# Patient Record
Sex: FEMALE | Race: WHITE | NOT HISPANIC OR LATINO | Employment: UNEMPLOYED | URBAN - METROPOLITAN AREA
[De-identification: names, ages, dates, MRNs, and addresses within clinical notes are randomized per-mention and may not be internally consistent; named-entity substitution may affect disease eponyms.]

---

## 2018-09-15 ENCOUNTER — APPOINTMENT (EMERGENCY)
Dept: RADIOLOGY | Facility: HOSPITAL | Age: 53
DRG: 300 | End: 2018-09-15
Payer: COMMERCIAL

## 2018-09-15 ENCOUNTER — HOSPITAL ENCOUNTER (INPATIENT)
Facility: HOSPITAL | Age: 53
LOS: 2 days | Discharge: HOME HEALTH | DRG: 300 | End: 2018-09-18
Attending: EMERGENCY MEDICINE | Admitting: SURGERY
Payer: COMMERCIAL

## 2018-09-15 ENCOUNTER — APPOINTMENT (EMERGENCY)
Dept: RADIOLOGY | Facility: HOSPITAL | Age: 53
DRG: 300 | End: 2018-09-15
Attending: EMERGENCY MEDICINE
Payer: COMMERCIAL

## 2018-09-15 DIAGNOSIS — I71.019 DISSECTION OF THORACIC AORTA (CMS/HCC): Primary | ICD-10-CM

## 2018-09-15 DIAGNOSIS — S22.49XA CLOSED FRACTURE OF MULTIPLE RIBS, UNSPECIFIED LATERALITY, INITIAL ENCOUNTER: ICD-10-CM

## 2018-09-15 DIAGNOSIS — R26.9 GAIT ABNORMALITY: ICD-10-CM

## 2018-09-15 DIAGNOSIS — Z04.1 EXAM FOLLOWING MVC (MOTOR VEHICLE COLLISION), NO APPARENT INJURY: ICD-10-CM

## 2018-09-15 DIAGNOSIS — R79.89 ELEVATED TROPONIN: ICD-10-CM

## 2018-09-15 LAB
APTT PPP: 28 SEC (ref 23–35)
BASOPHILS # BLD: 0.07 K/UL (ref 0.01–0.1)
BASOPHILS NFR BLD: 0.3 %
DIFFERENTIAL METHOD BLD: ABNORMAL
EOSINOPHIL # BLD: 0.36 K/UL (ref 0.04–0.36)
EOSINOPHIL NFR BLD: 1.6 %
ERYTHROCYTE [DISTWIDTH] IN BLOOD BY AUTOMATED COUNT: 13.8 % (ref 11.7–14.4)
HCT VFR BLDCO AUTO: 40.6 % (ref 35–45)
HGB BLD-MCNC: 14.3 G/DL (ref 11.8–15.7)
IMM GRANULOCYTES # BLD AUTO: 0.35 K/UL (ref 0–0.08)
IMM GRANULOCYTES NFR BLD AUTO: 1.6 %
INR PPP: 1.1 INR
LYMPHOCYTES # BLD: 3.71 K/UL (ref 1.2–3.5)
LYMPHOCYTES NFR BLD: 16.6 %
MCH RBC QN AUTO: 30.4 PG (ref 28–33.2)
MCHC RBC AUTO-ENTMCNC: 35.2 G/DL (ref 32.2–35.5)
MCV RBC AUTO: 86.2 FL (ref 83–98)
MONOCYTES # BLD: 1.07 K/UL (ref 0.28–0.8)
MONOCYTES NFR BLD: 4.8 %
NEUTROPHILS # BLD: 16.8 K/UL (ref 1.7–7)
NEUTS SEG NFR BLD: 75.1 %
NRBC BLD-RTO: 0 %
PDW BLD AUTO: 10.4 FL (ref 9.4–12.3)
PLATELET # BLD AUTO: 329 K/UL (ref 150–369)
PROTHROMBIN TIME: 13.7 SEC (ref 12.2–14.5)
RBC # BLD AUTO: 4.71 M/UL (ref 3.93–5.22)
WBC # BLD AUTO: 22.36 K/UL (ref 3.8–10.5)

## 2018-09-15 PROCEDURE — 85025 COMPLETE CBC W/AUTO DIFF WBC: CPT | Performed by: EMERGENCY MEDICINE

## 2018-09-15 PROCEDURE — 85730 THROMBOPLASTIN TIME PARTIAL: CPT | Performed by: EMERGENCY MEDICINE

## 2018-09-15 PROCEDURE — 85610 PROTHROMBIN TIME: CPT | Performed by: EMERGENCY MEDICINE

## 2018-09-15 PROCEDURE — 63600000 HC DRUGS/DETAIL CODE: Performed by: EMERGENCY MEDICINE

## 2018-09-15 PROCEDURE — 84484 ASSAY OF TROPONIN QUANT: CPT | Performed by: EMERGENCY MEDICINE

## 2018-09-15 PROCEDURE — 96374 THER/PROPH/DIAG INJ IV PUSH: CPT

## 2018-09-15 PROCEDURE — 96376 TX/PRO/DX INJ SAME DRUG ADON: CPT

## 2018-09-15 PROCEDURE — 72125 CT NECK SPINE W/O DYE: CPT

## 2018-09-15 PROCEDURE — 70450 CT HEAD/BRAIN W/O DYE: CPT

## 2018-09-15 PROCEDURE — 93005 ELECTROCARDIOGRAM TRACING: CPT | Performed by: EMERGENCY MEDICINE

## 2018-09-15 PROCEDURE — 80053 COMPREHEN METABOLIC PANEL: CPT | Performed by: EMERGENCY MEDICINE

## 2018-09-15 PROCEDURE — 99285 EMERGENCY DEPT VISIT HI MDM: CPT | Mod: 25

## 2018-09-15 PROCEDURE — 36415 COLL VENOUS BLD VENIPUNCTURE: CPT

## 2018-09-15 RX ORDER — ATORVASTATIN CALCIUM 40 MG/1
40 TABLET, FILM COATED ORAL DAILY
COMMUNITY
Start: 2018-07-06

## 2018-09-15 RX ORDER — FLUOXETINE HYDROCHLORIDE 40 MG/1
CAPSULE ORAL
COMMUNITY
End: 2023-08-21

## 2018-09-15 RX ORDER — MORPHINE SULFATE 4 MG/ML
4 INJECTION, SOLUTION INTRAMUSCULAR; INTRAVENOUS EVERY 30 MIN PRN
Status: DISCONTINUED | OUTPATIENT
Start: 2018-09-15 | End: 2018-09-16

## 2018-09-15 RX ORDER — CELECOXIB 100 MG/1
100 CAPSULE ORAL
COMMUNITY
End: 2023-08-21

## 2018-09-15 RX ORDER — AMLODIPINE BESYLATE 5 MG/1
5 TABLET ORAL
COMMUNITY
Start: 2018-07-06 | End: 2018-09-18 | Stop reason: HOSPADM

## 2018-09-15 RX ORDER — ESOMEPRAZOLE MAGNESIUM 40 MG/1
40 CAPSULE, DELAYED RELEASE ORAL
COMMUNITY
Start: 2018-04-23 | End: 2023-08-26 | Stop reason: HOSPADM

## 2018-09-15 RX ORDER — VIT C/E/ZN/COPPR/LUTEIN/ZEAXAN 250MG-90MG
CAPSULE ORAL
COMMUNITY
End: 2023-08-21

## 2018-09-15 RX ORDER — CLOPIDOGREL BISULFATE 75 MG/1
75 TABLET ORAL DAILY
COMMUNITY
Start: 2018-05-16

## 2018-09-15 RX ORDER — MODAFINIL 100 MG/1
TABLET ORAL
COMMUNITY
End: 2023-08-21

## 2018-09-15 RX ADMIN — MORPHINE SULFATE 4 MG: 4 INJECTION INTRAVENOUS at 23:41

## 2018-09-15 ASSESSMENT — ENCOUNTER SYMPTOMS
ABDOMINAL PAIN: 1
DIARRHEA: 0
NAUSEA: 0
SORE THROAT: 0
HEADACHES: 0
COUGH: 0
SHORTNESS OF BREATH: 0
FATIGUE: 0
VOMITING: 0
BACK PAIN: 0
FEVER: 0
NECK PAIN: 0
DIZZINESS: 0
WEAKNESS: 1
ACTIVITY CHANGE: 0
CHEST TIGHTNESS: 0

## 2018-09-16 ENCOUNTER — APPOINTMENT (EMERGENCY)
Dept: RADIOLOGY | Facility: HOSPITAL | Age: 53
DRG: 300 | End: 2018-09-16
Attending: EMERGENCY MEDICINE
Payer: COMMERCIAL

## 2018-09-16 PROBLEM — Z04.1 EXAM FOLLOWING MVC (MOTOR VEHICLE COLLISION), NO APPARENT INJURY: Status: ACTIVE | Noted: 2018-09-16

## 2018-09-16 LAB
ABO + RH BLD: NORMAL
ALBUMIN SERPL-MCNC: 3.2 G/DL (ref 3.4–5)
ALBUMIN SERPL-MCNC: 3.6 G/DL (ref 3.4–5)
ALP SERPL-CCNC: 105 U/L (ref 35–126)
ALP SERPL-CCNC: 125 U/L (ref 35–126)
ALT SERPL-CCNC: 149 U/L (ref 11–54)
ALT SERPL-CCNC: 97 U/L (ref 11–54)
AMPHET UR QL SCN: ABNORMAL
ANION GAP SERPL CALC-SCNC: 10 MEQ/L (ref 3–15)
ANION GAP SERPL CALC-SCNC: 8 MEQ/L (ref 3–15)
APTT PPP: 32 SEC (ref 23–35)
AST SERPL-CCNC: 103 U/L (ref 15–41)
AST SERPL-CCNC: 201 U/L (ref 15–41)
ATRIAL RATE: 90
BACTERIA URNS QL MICRO: 1 /HPF
BARBITURATES UR QL SCN: ABNORMAL
BENZODIAZ UR QL SCN: ABNORMAL
BILIRUB SERPL-MCNC: 0.6 MG/DL (ref 0.3–1.2)
BILIRUB SERPL-MCNC: 0.6 MG/DL (ref 0.3–1.2)
BILIRUB UR QL STRIP.AUTO: NEGATIVE MG/DL
BLD GP AB SCN SERPL QL: NEGATIVE
BUN SERPL-MCNC: 12 MG/DL (ref 8–20)
BUN SERPL-MCNC: 13 MG/DL (ref 8–20)
CA-I BLD-SCNC: 1.15 MMOL/L (ref 1.15–1.27)
CALCIUM SERPL-MCNC: 8.9 MG/DL (ref 8.9–10.3)
CALCIUM SERPL-MCNC: 9.4 MG/DL (ref 8.9–10.3)
CANNABINOIDS UR QL SCN: ABNORMAL
CHLORIDE SERPL-SCNC: 102 MEQ/L (ref 98–109)
CHLORIDE SERPL-SCNC: 104 MEQ/L (ref 98–109)
CLARITY UR REFRACT.AUTO: CLEAR
CO2 SERPL-SCNC: 27 MEQ/L (ref 22–32)
CO2 SERPL-SCNC: 28 MEQ/L (ref 22–32)
COCAINE UR QL SCN: ABNORMAL
COLOR UR AUTO: YELLOW
CREAT SERPL-MCNC: 0.9 MG/DL (ref 0.6–1.1)
CREAT SERPL-MCNC: 0.9 MG/DL (ref 0.6–1.1)
D AG BLD QL: NEGATIVE
ERYTHROCYTE [DISTWIDTH] IN BLOOD BY AUTOMATED COUNT: 14 % (ref 11.7–14.4)
ERYTHROCYTE [DISTWIDTH] IN BLOOD BY AUTOMATED COUNT: 14 % (ref 11.7–14.4)
ERYTHROCYTE [DISTWIDTH] IN BLOOD BY AUTOMATED COUNT: 14.2 % (ref 11.7–14.4)
ETHANOL SERPL-MCNC: <5 MG/DL
GFR SERPL CREATININE-BSD FRML MDRD: >60 ML/MIN/1.73M*2
GFR SERPL CREATININE-BSD FRML MDRD: >60 ML/MIN/1.73M*2
GLUCOSE SERPL-MCNC: 112 MG/DL (ref 70–99)
GLUCOSE SERPL-MCNC: 124 MG/DL (ref 70–99)
GLUCOSE UR STRIP.AUTO-MCNC: NEGATIVE MG/DL
HCT VFR BLDCO AUTO: 34 % (ref 35–45)
HCT VFR BLDCO AUTO: 35.4 % (ref 35–45)
HCT VFR BLDCO AUTO: 35.6 % (ref 35–45)
HGB BLD-MCNC: 11.3 G/DL (ref 11.8–15.7)
HGB BLD-MCNC: 12.4 G/DL (ref 11.8–15.7)
HGB BLD-MCNC: 12.4 G/DL (ref 11.8–15.7)
HGB UR QL STRIP.AUTO: 1
HYALINE CASTS #/AREA URNS LPF: ABNORMAL /LPF
INR PPP: 1.1 INR
KETONES UR STRIP.AUTO-MCNC: NEGATIVE MG/DL
LABORATORY COMMENT REPORT: NORMAL
LACTATE SERPL-SCNC: 1.3 MMOL/L (ref 0.4–2)
LACTATE SERPL-SCNC: 1.9 MMOL/L (ref 0.4–2)
LACTATE SERPL-SCNC: 2.6 MMOL/L (ref 0.4–2)
LEUKOCYTE ESTERASE UR QL STRIP.AUTO: 1
MAGNESIUM SERPL-MCNC: 1.5 MG/DL (ref 1.8–2.5)
MCH RBC QN AUTO: 28.8 PG (ref 28–33.2)
MCH RBC QN AUTO: 29.8 PG (ref 28–33.2)
MCH RBC QN AUTO: 30.2 PG (ref 28–33.2)
MCHC RBC AUTO-ENTMCNC: 33.2 G/DL (ref 32.2–35.5)
MCHC RBC AUTO-ENTMCNC: 34.8 G/DL (ref 32.2–35.5)
MCHC RBC AUTO-ENTMCNC: 35 G/DL (ref 32.2–35.5)
MCV RBC AUTO: 85.1 FL (ref 83–98)
MCV RBC AUTO: 86.7 FL (ref 83–98)
MCV RBC AUTO: 86.8 FL (ref 83–98)
NITRITE UR QL STRIP.AUTO: NEGATIVE
OPIATES UR QL SCN: POSITIVE
P AXIS: 64
PCP UR QL SCN: ABNORMAL
PDW BLD AUTO: 10 FL (ref 9.4–12.3)
PDW BLD AUTO: 10.1 FL (ref 9.4–12.3)
PDW BLD AUTO: 9.8 FL (ref 9.4–12.3)
PH UR STRIP.AUTO: 6 [PH]
PHOSPHATE SERPL-MCNC: 3.1 MG/DL (ref 2.4–4.7)
PLATELET # BLD AUTO: 249 K/UL (ref 150–369)
PLATELET # BLD AUTO: 261 K/UL (ref 150–369)
PLATELET # BLD AUTO: 267 K/UL (ref 150–369)
POTASSIUM SERPL-SCNC: 3.1 MEQ/L (ref 3.6–5.1)
POTASSIUM SERPL-SCNC: 3.2 MEQ/L (ref 3.6–5.1)
PR INTERVAL: 130
PROT SERPL-MCNC: 5.8 G/DL (ref 6–8.2)
PROT SERPL-MCNC: 7.2 G/DL (ref 6–8.2)
PROT UR QL STRIP.AUTO: 2
PROTHROMBIN TIME: 13.9 SEC (ref 12.2–14.5)
QRS DURATION: 86
QT INTERVAL: 368
QTC CALCULATION(BAZETT): 450
R AXIS: 4
RAINBOW HOLD SPECIMEN: NORMAL
RBC # BLD AUTO: 3.92 M/UL (ref 3.93–5.22)
RBC # BLD AUTO: 4.1 M/UL (ref 3.93–5.22)
RBC # BLD AUTO: 4.16 M/UL (ref 3.93–5.22)
RBC #/AREA URNS HPF: ABNORMAL /HPF
SODIUM SERPL-SCNC: 138 MEQ/L (ref 136–144)
SODIUM SERPL-SCNC: 141 MEQ/L (ref 136–144)
SP GR UR REFRACT.AUTO: >1.035
SQUAMOUS URNS QL MICRO: 1 /HPF
T WAVE AXIS: -13
TROPONIN I SERPL-MCNC: 0.07 NG/ML
TROPONIN I SERPL-MCNC: 0.11 NG/ML
TROPONIN I SERPL-MCNC: 0.19 NG/ML
TROPONIN I SERPL-MCNC: 0.31 NG/ML
TROPONIN I SERPL-MCNC: 0.32 NG/ML
UROBILINOGEN UR STRIP-ACNC: 0.2 EU/DL
VENTRICULAR RATE: 90
WBC # BLD AUTO: 13.23 K/UL (ref 3.8–10.5)
WBC # BLD AUTO: 14.52 K/UL (ref 3.8–10.5)
WBC # BLD AUTO: 14.82 K/UL (ref 3.8–10.5)
WBC #/AREA URNS HPF: ABNORMAL /HPF

## 2018-09-16 PROCEDURE — 82330 ASSAY OF CALCIUM: CPT | Performed by: NURSE PRACTITIONER

## 2018-09-16 PROCEDURE — 83605 ASSAY OF LACTIC ACID: CPT | Performed by: PHYSICIAN ASSISTANT

## 2018-09-16 PROCEDURE — 74160 CT ABDOMEN W/CONTRAST: CPT

## 2018-09-16 PROCEDURE — G0480 DRUG TEST DEF 1-7 CLASSES: HCPCS | Performed by: PHYSICIAN ASSISTANT

## 2018-09-16 PROCEDURE — 85610 PROTHROMBIN TIME: CPT | Performed by: NURSE PRACTITIONER

## 2018-09-16 PROCEDURE — 25000000 HC PHARMACY GENERAL: Performed by: STUDENT IN AN ORGANIZED HEALTH CARE EDUCATION/TRAINING PROGRAM

## 2018-09-16 PROCEDURE — 63700000 HC SELF-ADMINISTRABLE DRUG: Performed by: PHYSICIAN ASSISTANT

## 2018-09-16 PROCEDURE — 20000000 HC ROOM AND CARE ICU

## 2018-09-16 PROCEDURE — 93005 ELECTROCARDIOGRAM TRACING: CPT | Performed by: PHYSICIAN ASSISTANT

## 2018-09-16 PROCEDURE — 87086 URINE CULTURE/COLONY COUNT: CPT | Performed by: NURSE PRACTITIONER

## 2018-09-16 PROCEDURE — 71045 X-RAY EXAM CHEST 1 VIEW: CPT

## 2018-09-16 PROCEDURE — 25000000 HC PHARMACY GENERAL: Performed by: NURSE PRACTITIONER

## 2018-09-16 PROCEDURE — 83735 ASSAY OF MAGNESIUM: CPT | Performed by: NURSE PRACTITIONER

## 2018-09-16 PROCEDURE — 81001 URINALYSIS AUTO W/SCOPE: CPT | Performed by: PHYSICIAN ASSISTANT

## 2018-09-16 PROCEDURE — 73564 X-RAY EXAM KNEE 4 OR MORE: CPT | Mod: LT,76

## 2018-09-16 PROCEDURE — 63600000 HC DRUGS/DETAIL CODE: Performed by: PHYSICIAN ASSISTANT

## 2018-09-16 PROCEDURE — 73564 X-RAY EXAM KNEE 4 OR MORE: CPT | Mod: RT

## 2018-09-16 PROCEDURE — 85027 COMPLETE CBC AUTOMATED: CPT | Performed by: NURSE PRACTITIONER

## 2018-09-16 PROCEDURE — 73110 X-RAY EXAM OF WRIST: CPT | Mod: RT

## 2018-09-16 PROCEDURE — 73130 X-RAY EXAM OF HAND: CPT | Mod: LT

## 2018-09-16 PROCEDURE — 86850 RBC ANTIBODY SCREEN: CPT

## 2018-09-16 PROCEDURE — 63600105 HC IODINE BASED CONTRAST: Performed by: EMERGENCY MEDICINE

## 2018-09-16 PROCEDURE — 84484 ASSAY OF TROPONIN QUANT: CPT | Performed by: PHYSICIAN ASSISTANT

## 2018-09-16 PROCEDURE — 36415 COLL VENOUS BLD VENIPUNCTURE: CPT | Performed by: EMERGENCY MEDICINE

## 2018-09-16 PROCEDURE — 25800000 HC PHARMACY IV SOLUTIONS: Performed by: PHYSICIAN ASSISTANT

## 2018-09-16 PROCEDURE — 99221 1ST HOSP IP/OBS SF/LOW 40: CPT | Performed by: SURGERY

## 2018-09-16 PROCEDURE — 86920 COMPATIBILITY TEST SPIN: CPT

## 2018-09-16 PROCEDURE — 84450 TRANSFERASE (AST) (SGOT): CPT | Performed by: NURSE PRACTITIONER

## 2018-09-16 PROCEDURE — 93005 ELECTROCARDIOGRAM TRACING: CPT | Performed by: SURGERY

## 2018-09-16 PROCEDURE — 71260 CT THORAX DX C+: CPT

## 2018-09-16 PROCEDURE — 83605 ASSAY OF LACTIC ACID: CPT | Performed by: EMERGENCY MEDICINE

## 2018-09-16 PROCEDURE — 25000000 HC PHARMACY GENERAL: Performed by: PHYSICIAN ASSISTANT

## 2018-09-16 PROCEDURE — 80307 DRUG TEST PRSMV CHEM ANLYZR: CPT | Performed by: PHYSICIAN ASSISTANT

## 2018-09-16 PROCEDURE — 84100 ASSAY OF PHOSPHORUS: CPT | Performed by: NURSE PRACTITIONER

## 2018-09-16 PROCEDURE — 63600000 HC DRUGS/DETAIL CODE: Performed by: EMERGENCY MEDICINE

## 2018-09-16 PROCEDURE — 93010 ELECTROCARDIOGRAM REPORT: CPT | Performed by: INTERNAL MEDICINE

## 2018-09-16 PROCEDURE — 85730 THROMBOPLASTIN TIME PARTIAL: CPT | Performed by: NURSE PRACTITIONER

## 2018-09-16 RX ORDER — POTASSIUM CHLORIDE 750 MG/1
20 TABLET, FILM COATED, EXTENDED RELEASE ORAL AS NEEDED
Status: DISCONTINUED | OUTPATIENT
Start: 2018-09-16 | End: 2018-09-18 | Stop reason: HOSPADM

## 2018-09-16 RX ORDER — SODIUM CHLORIDE 9 MG/ML
INJECTION, SOLUTION INTRAVENOUS CONTINUOUS
Status: DISCONTINUED | OUTPATIENT
Start: 2018-09-16 | End: 2018-09-16

## 2018-09-16 RX ORDER — CLOPIDOGREL BISULFATE 75 MG/1
75 TABLET ORAL DAILY
Status: DISCONTINUED | OUTPATIENT
Start: 2018-09-16 | End: 2018-09-16

## 2018-09-16 RX ORDER — LIDOCAINE 560 MG/1
1 PATCH PERCUTANEOUS; TOPICAL; TRANSDERMAL DAILY
Status: DISCONTINUED | OUTPATIENT
Start: 2018-09-16 | End: 2018-09-18 | Stop reason: HOSPADM

## 2018-09-16 RX ORDER — METOPROLOL TARTRATE 1 MG/ML
2.5 INJECTION, SOLUTION INTRAVENOUS
Status: DISCONTINUED | OUTPATIENT
Start: 2018-09-16 | End: 2018-09-17

## 2018-09-16 RX ORDER — AMLODIPINE BESYLATE 5 MG/1
5 TABLET ORAL DAILY
Status: DISCONTINUED | OUTPATIENT
Start: 2018-09-16 | End: 2018-09-16

## 2018-09-16 RX ORDER — METOPROLOL TARTRATE 1 MG/ML
2.5 INJECTION, SOLUTION INTRAVENOUS ONCE
Status: COMPLETED | OUTPATIENT
Start: 2018-09-16 | End: 2018-09-16

## 2018-09-16 RX ORDER — FLUOXETINE HYDROCHLORIDE 20 MG/1
40 CAPSULE ORAL DAILY
Status: DISCONTINUED | OUTPATIENT
Start: 2018-09-16 | End: 2018-09-18 | Stop reason: HOSPADM

## 2018-09-16 RX ORDER — IBUPROFEN 200 MG
16-32 TABLET ORAL AS NEEDED
Status: DISCONTINUED | OUTPATIENT
Start: 2018-09-16 | End: 2018-09-18

## 2018-09-16 RX ORDER — OXYCODONE HYDROCHLORIDE 5 MG/1
5 TABLET ORAL EVERY 4 HOURS PRN
Status: DISCONTINUED | OUTPATIENT
Start: 2018-09-16 | End: 2018-09-18 | Stop reason: HOSPADM

## 2018-09-16 RX ORDER — DEXTROSE 50 % IN WATER (D50W) INTRAVENOUS SYRINGE
25 AS NEEDED
Status: DISCONTINUED | OUTPATIENT
Start: 2018-09-16 | End: 2018-09-18 | Stop reason: HOSPADM

## 2018-09-16 RX ORDER — OXYCODONE HYDROCHLORIDE 5 MG/1
10 TABLET ORAL EVERY 4 HOURS PRN
Status: DISCONTINUED | OUTPATIENT
Start: 2018-09-16 | End: 2018-09-18 | Stop reason: HOSPADM

## 2018-09-16 RX ORDER — ONDANSETRON HYDROCHLORIDE 2 MG/ML
4 INJECTION, SOLUTION INTRAVENOUS EVERY 8 HOURS PRN
Status: DISCONTINUED | OUTPATIENT
Start: 2018-09-16 | End: 2018-09-18 | Stop reason: HOSPADM

## 2018-09-16 RX ORDER — HEPARIN SODIUM 5000 [USP'U]/ML
5000 INJECTION, SOLUTION INTRAVENOUS; SUBCUTANEOUS EVERY 8 HOURS
Status: DISCONTINUED | OUTPATIENT
Start: 2018-09-16 | End: 2018-09-16

## 2018-09-16 RX ORDER — MAGNESIUM SULFATE HEPTAHYDRATE 40 MG/ML
2 INJECTION, SOLUTION INTRAVENOUS AS NEEDED
Status: DISCONTINUED | OUTPATIENT
Start: 2018-09-16 | End: 2018-09-18 | Stop reason: HOSPADM

## 2018-09-16 RX ORDER — DEXTROSE 40 %
15-30 GEL (GRAM) ORAL AS NEEDED
Status: DISCONTINUED | OUTPATIENT
Start: 2018-09-16 | End: 2018-09-18

## 2018-09-16 RX ORDER — AMOXICILLIN 250 MG
1 CAPSULE ORAL 2 TIMES DAILY
Status: DISCONTINUED | OUTPATIENT
Start: 2018-09-16 | End: 2018-09-18 | Stop reason: HOSPADM

## 2018-09-16 RX ORDER — POTASSIUM CHLORIDE 750 MG/1
40 TABLET, FILM COATED, EXTENDED RELEASE ORAL AS NEEDED
Status: DISCONTINUED | OUTPATIENT
Start: 2018-09-16 | End: 2018-09-18 | Stop reason: HOSPADM

## 2018-09-16 RX ORDER — POTASSIUM CHLORIDE 14.9 MG/ML
20 INJECTION INTRAVENOUS AS NEEDED
Status: DISCONTINUED | OUTPATIENT
Start: 2018-09-16 | End: 2018-09-18

## 2018-09-16 RX ORDER — SODIUM CHLORIDE 9 MG/ML
5 INJECTION, SOLUTION INTRAVENOUS AS NEEDED
Status: ACTIVE | OUTPATIENT
Start: 2018-09-16 | End: 2018-09-17

## 2018-09-16 RX ORDER — CHOLECALCIFEROL (VITAMIN D3) 25 MCG
1000 TABLET ORAL DAILY
Status: DISCONTINUED | OUTPATIENT
Start: 2018-09-16 | End: 2018-09-18 | Stop reason: HOSPADM

## 2018-09-16 RX ORDER — ACETAMINOPHEN 325 MG/1
650 TABLET ORAL EVERY 6 HOURS
Status: DISCONTINUED | OUTPATIENT
Start: 2018-09-16 | End: 2018-09-18 | Stop reason: HOSPADM

## 2018-09-16 RX ORDER — PANTOPRAZOLE SODIUM 40 MG/1
40 TABLET, DELAYED RELEASE ORAL DAILY
Status: DISCONTINUED | OUTPATIENT
Start: 2018-09-16 | End: 2018-09-18 | Stop reason: HOSPADM

## 2018-09-16 RX ORDER — METOPROLOL TARTRATE 1 MG/ML
2.5 INJECTION, SOLUTION INTRAVENOUS ONCE
Status: COMPLETED | OUTPATIENT
Start: 2018-09-17 | End: 2018-09-16

## 2018-09-16 RX ADMIN — MORPHINE SULFATE 4 MG: 4 INJECTION INTRAVENOUS at 04:49

## 2018-09-16 RX ADMIN — METOPROLOL TARTRATE 2.5 MG: 1 INJECTION, SOLUTION INTRAVENOUS at 21:27

## 2018-09-16 RX ADMIN — MAGNESIUM SULFATE IN WATER 2 G: 40 INJECTION, SOLUTION INTRAVENOUS at 14:15

## 2018-09-16 RX ADMIN — PANTOPRAZOLE SODIUM 40 MG: 40 TABLET, DELAYED RELEASE ORAL at 08:48

## 2018-09-16 RX ADMIN — POTASSIUM CHLORIDE 40 MEQ: 2 INJECTION, SOLUTION, CONCENTRATE INTRAVENOUS at 14:15

## 2018-09-16 RX ADMIN — METOPROLOL TARTRATE 2.5 MG: 1 INJECTION, SOLUTION INTRAVENOUS at 23:55

## 2018-09-16 RX ADMIN — IOHEXOL 125 ML: 300 INJECTION, SOLUTION INTRAVENOUS at 02:15

## 2018-09-16 RX ADMIN — ACETAMINOPHEN 650 MG: 325 TABLET ORAL at 12:50

## 2018-09-16 RX ADMIN — HEPARIN SODIUM 5000 UNITS: 5000 INJECTION, SOLUTION INTRAVENOUS; SUBCUTANEOUS at 08:49

## 2018-09-16 RX ADMIN — OXYCODONE HYDROCHLORIDE 5 MG: 5 TABLET ORAL at 12:50

## 2018-09-16 RX ADMIN — OXYCODONE HYDROCHLORIDE 10 MG: 5 TABLET ORAL at 16:46

## 2018-09-16 RX ADMIN — ACETAMINOPHEN 650 MG: 325 TABLET ORAL at 08:48

## 2018-09-16 RX ADMIN — ACETAMINOPHEN 650 MG: 325 TABLET ORAL at 18:07

## 2018-09-16 RX ADMIN — OXYCODONE HYDROCHLORIDE 10 MG: 5 TABLET ORAL at 22:43

## 2018-09-16 RX ADMIN — CALCIUM GLUCONATE 1000 MG: 94 INJECTION, SOLUTION INTRAVENOUS at 16:46

## 2018-09-16 RX ADMIN — METOPROLOL TARTRATE 2.5 MG: 1 INJECTION, SOLUTION INTRAVENOUS at 11:10

## 2018-09-16 RX ADMIN — ACETAMINOPHEN 650 MG: 325 TABLET ORAL at 22:44

## 2018-09-16 RX ADMIN — FLUOXETINE HYDROCHLORIDE 40 MG: 20 CAPSULE ORAL at 08:48

## 2018-09-16 RX ADMIN — LIDOCAINE 1 PATCH: 246 PATCH TOPICAL at 08:48

## 2018-09-16 RX ADMIN — CLOPIDOGREL BISULFATE 75 MG: 75 TABLET, FILM COATED ORAL at 08:48

## 2018-09-16 RX ADMIN — VITAMIN D, TAB 1000IU (100/BT) 1000 UNITS: 25 TAB at 08:48

## 2018-09-16 RX ADMIN — METOPROLOL TARTRATE 2.5 MG: 1 INJECTION, SOLUTION INTRAVENOUS at 18:08

## 2018-09-16 ASSESSMENT — COGNITIVE AND FUNCTIONAL STATUS - GENERAL
HELP NEEDED FOR PERSONAL GROOMING: 4 - NONE
STANDING UP FROM CHAIR USING ARMS: 4 - NONE
EATING MEALS: 4 - NONE
TOILETING: 4 - NONE
CLIMB 3 TO 5 STEPS WITH RAILING: 4 - NONE
DRESSING REGULAR LOWER BODY CLOTHING: 4 - NONE
WALKING IN HOSPITAL ROOM: 4 - NONE
HELP NEEDED FOR BATHING: 4 - NONE
DRESSING REGULAR UPPER BODY CLOTHING: 4 - NONE
MOVING TO AND FROM BED TO CHAIR: 4 - NONE

## 2018-09-16 NOTE — PROGRESS NOTES
Patient: Shayla Lawrence  Location: [facility] Down 0354  MRN: [id number]  Today's date: 9/16/2018    Attempted to see patient for therapy. Unable due to patient medically unstable.  Newly found aortic tear, will await clearance by MD for OT evaluation.
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
## 2018-09-16 NOTE — PROGRESS NOTES
Trauma attending:    Patient seen and evaluated in interim follow-up.  Patient without new complaint.  Specifically no new chest pain.    Radiology review after preliminary [place]  reports intimal abnormality just distal to the left subclavian artery, and separately at the descending thoracic aorta.  There is no dissection with either of these.  There is no periadventitial hematoma at either site.    Patient's blood pressure  and heart rate .    The patient reports no new symptoms, reporting only focal pain at the sites of fractures anteriorly, at the chest wall.    Her bilateral upper extremity pulse exam is symmetric as are the femoral pulses.  No abdominal/visceral pain.    I reviewed the case with the vascular surgeon on call Dr. Hauser.  We will evaluate the patient and help direct further diagnostics and all interventions.  For now we will treat her as a posttraumatic aortic injury and beta-blocker to be instituted controlling DP/DT.    I have reviewed all the above with the patient and her daughter in detail.  Patient will be moved to a higher level of care for monitoring.    What I believe is a separate issue to the aortic concerns above, the patient with a mild elevation in her troponin.  No indication of acute coronary syndrome on EKG, otherwise.  Cardiology evaluation appropriate.    Due to the potential for acute decompensation with the above, ICU level of care appropriate.      I provided 90 minutes of critical care services to support this life/organ threatening illness. This time reflects daily cumulative, direct time spent in the care and direct coordination of care of the patient throughout the day by the attending physician, excluding separately billable services, procedures, teaching time and time spent by non-physician providers.

## 2018-09-16 NOTE — ED NOTES
Joseph richards called to [address] after room was changed from sicu bed 2 to 354     Joseph Hunt, JOSE  09/16/18 1614
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
## 2018-09-16 NOTE — PROGRESS NOTES
Patient: Shayla Lawrence  Location:  Surgical Step Down 0354  MRN: [id number]  Today's date: 9/16/2018    Attempted to see patient for therapy. Unable due to patient medically unstable (new dx aortic tear, await medical clearance for mobilization). MD with patient

## 2018-09-16 NOTE — PLAN OF CARE
Problem: Patient Care Overview  Goal: Discharge Needs Assessment   09/16/18 1546   DC Needs Assessment   Concerns To Be Addressed no discharge needs identified   Readmission Within The Last 30 Days no previous admission in last 30 days   Provider Choice List(s) Given no   Anticipated Discharge Disposition home with outpatient services   Current Health   Anticipated Changes Related to Illness none   Activity/Self Care ROS   Equipment Currently Used at Home cane, straight;other (see comments)  (ELECTRIC WHEELCHAIR)

## 2018-09-16 NOTE — CONSULTS
Please see Vascular Consult note.    Sunil Hall MD  PGY-3, Department of Surgery  [facility]  Beeper: [phone]  Cell: [phone]

## 2018-09-17 ENCOUNTER — APPOINTMENT (INPATIENT)
Dept: RADIOLOGY | Facility: HOSPITAL | Age: 53
DRG: 300 | End: 2018-09-17
Attending: PHYSICIAN ASSISTANT
Payer: COMMERCIAL

## 2018-09-17 PROBLEM — S36.113A LIVER LACERATION: Status: ACTIVE | Noted: 2018-09-17

## 2018-09-17 PROBLEM — R26.9 GAIT ABNORMALITY: Status: ACTIVE | Noted: 2018-09-17

## 2018-09-17 PROBLEM — S22.43XA CLOSED FRACTURE OF MULTIPLE RIBS OF BOTH SIDES: Status: ACTIVE | Noted: 2018-09-17
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
## 2018-09-17 PROBLEM — I71.00 AORTIC DISSECTION (CMS/HCC): Status: ACTIVE | Noted: 2018-09-17

## 2018-09-17 PROBLEM — S27.0XXA TRAUMATIC PNEUMOTHORAX: Status: ACTIVE | Noted: 2018-09-17

## 2018-09-17 LAB
ANION GAP SERPL CALC-SCNC: 9 MEQ/L (ref 3–15)
APTT PPP: 33 SEC (ref 23–35)
ATRIAL RATE: 82
BUN SERPL-MCNC: 11 MG/DL (ref 8–20)
CALCIUM SERPL-MCNC: 9.2 MG/DL (ref 8.9–10.3)
CHLORIDE SERPL-SCNC: 107 MEQ/L (ref 98–109)
CO2 SERPL-SCNC: 26 MEQ/L (ref 22–32)
CREAT SERPL-MCNC: 0.8 MG/DL (ref 0.6–1.1)
ERYTHROCYTE [DISTWIDTH] IN BLOOD BY AUTOMATED COUNT: 14 % (ref 11.7–14.4)
GFR SERPL CREATININE-BSD FRML MDRD: >60 ML/MIN/1.73M*2
GLUCOSE SERPL-MCNC: 94 MG/DL (ref 70–99)
HCT VFR BLDCO AUTO: 34.7 % (ref 35–45)
HGB BLD-MCNC: 11.6 G/DL (ref 11.8–15.7)
INR PPP: 1 INR
MAGNESIUM SERPL-MCNC: 1.9 MG/DL (ref 1.8–2.5)
MCH RBC QN AUTO: 29.3 PG (ref 28–33.2)
MCHC RBC AUTO-ENTMCNC: 33.4 G/DL (ref 32.2–35.5)
MCV RBC AUTO: 87.6 FL (ref 83–98)
P AXIS: 50
PDW BLD AUTO: 9.9 FL (ref 9.4–12.3)
PHOSPHATE SERPL-MCNC: 3.1 MG/DL (ref 2.4–4.7)
PLATELET # BLD AUTO: 246 K/UL (ref 150–369)
POTASSIUM SERPL-SCNC: 3.9 MEQ/L (ref 3.6–5.1)
PR INTERVAL: 134
PROTHROMBIN TIME: 13.5 SEC (ref 12.2–14.5)
QRS DURATION: 86
QT INTERVAL: 376
QTC CALCULATION(BAZETT): 439
R AXIS: 10
RBC # BLD AUTO: 3.96 M/UL (ref 3.93–5.22)
SODIUM SERPL-SCNC: 142 MEQ/L (ref 136–144)
T WAVE AXIS: 83
TROPONIN I SERPL-MCNC: 0.04 NG/ML
VENTRICULAR RATE: 82
WBC # BLD AUTO: 15.65 K/UL (ref 3.8–10.5)

## 2018-09-17 PROCEDURE — 36415 COLL VENOUS BLD VENIPUNCTURE: CPT | Performed by: PHYSICIAN ASSISTANT

## 2018-09-17 PROCEDURE — 85730 THROMBOPLASTIN TIME PARTIAL: CPT | Performed by: PHYSICIAN ASSISTANT

## 2018-09-17 PROCEDURE — 20000000 HC ROOM AND CARE ICU

## 2018-09-17 PROCEDURE — 63700000 HC SELF-ADMINISTRABLE DRUG: Performed by: PHYSICIAN ASSISTANT

## 2018-09-17 PROCEDURE — 25000000 HC PHARMACY GENERAL: Performed by: NURSE PRACTITIONER

## 2018-09-17 PROCEDURE — 85610 PROTHROMBIN TIME: CPT | Performed by: PHYSICIAN ASSISTANT

## 2018-09-17 PROCEDURE — 63600105 HC IODINE BASED CONTRAST: Performed by: PHYSICIAN ASSISTANT

## 2018-09-17 PROCEDURE — 83735 ASSAY OF MAGNESIUM: CPT | Performed by: PHYSICIAN ASSISTANT

## 2018-09-17 PROCEDURE — 80048 BASIC METABOLIC PNL TOTAL CA: CPT | Performed by: PHYSICIAN ASSISTANT

## 2018-09-17 PROCEDURE — 85027 COMPLETE CBC AUTOMATED: CPT | Performed by: NURSE PRACTITIONER

## 2018-09-17 PROCEDURE — 97166 OT EVAL MOD COMPLEX 45 MIN: CPT | Mod: GO

## 2018-09-17 PROCEDURE — 84484 ASSAY OF TROPONIN QUANT: CPT | Performed by: PHYSICIAN ASSISTANT

## 2018-09-17 PROCEDURE — 25000000 HC PHARMACY GENERAL: Performed by: PHYSICIAN ASSISTANT

## 2018-09-17 PROCEDURE — 99233 SBSQ HOSP IP/OBS HIGH 50: CPT | Performed by: SURGERY

## 2018-09-17 PROCEDURE — 84100 ASSAY OF PHOSPHORUS: CPT | Performed by: PHYSICIAN ASSISTANT

## 2018-09-17 PROCEDURE — 97162 PT EVAL MOD COMPLEX 30 MIN: CPT | Mod: GP

## 2018-09-17 PROCEDURE — 25800000 HC PHARMACY IV SOLUTIONS: Performed by: PHYSICIAN ASSISTANT

## 2018-09-17 PROCEDURE — 71045 X-RAY EXAM CHEST 1 VIEW: CPT

## 2018-09-17 PROCEDURE — 71275 CT ANGIOGRAPHY CHEST: CPT

## 2018-09-17 PROCEDURE — 63700000 HC SELF-ADMINISTRABLE DRUG: Performed by: NURSE PRACTITIONER

## 2018-09-17 PROCEDURE — 97530 THERAPEUTIC ACTIVITIES: CPT | Mod: GO

## 2018-09-17 RX ORDER — ATORVASTATIN CALCIUM 20 MG/1
20 TABLET, FILM COATED ORAL
Status: DISCONTINUED | OUTPATIENT
Start: 2018-09-17 | End: 2018-09-18 | Stop reason: HOSPADM

## 2018-09-17 RX ORDER — IBUPROFEN 200 MG
1 TABLET ORAL DAILY
Status: DISCONTINUED | OUTPATIENT
Start: 2018-09-17 | End: 2018-09-18 | Stop reason: HOSPADM

## 2018-09-17 RX ORDER — CLOPIDOGREL BISULFATE 75 MG/1
75 TABLET ORAL DAILY
Status: DISCONTINUED | OUTPATIENT
Start: 2018-09-17 | End: 2018-09-17

## 2018-09-17 RX ORDER — LANOLIN ALCOHOL/MO/W.PET/CERES
1000 CREAM (GRAM) TOPICAL DAILY
COMMUNITY
End: 2023-08-21

## 2018-09-17 RX ORDER — IBUPROFEN 200 MG
1 TABLET ORAL DAILY
Qty: 30 PATCH | Refills: 0 | OUTPATIENT
Start: 2018-09-17 | End: 2023-08-26 | Stop reason: HOSPADM

## 2018-09-17 RX ORDER — METOPROLOL TARTRATE 1 MG/ML
5 INJECTION, SOLUTION INTRAVENOUS ONCE
Status: COMPLETED | OUTPATIENT
Start: 2018-09-17 | End: 2018-09-17

## 2018-09-17 RX ORDER — OXYCODONE HYDROCHLORIDE 5 MG/1
10 TABLET ORAL EVERY 6 HOURS PRN
COMMUNITY

## 2018-09-17 RX ORDER — MODAFINIL 100 MG/1
100 TABLET ORAL DAILY
Status: DISCONTINUED | OUTPATIENT
Start: 2018-09-17 | End: 2018-09-18 | Stop reason: HOSPADM

## 2018-09-17 RX ORDER — METOPROLOL TARTRATE 25 MG/1
25 TABLET, FILM COATED ORAL 2 TIMES DAILY
Status: DISCONTINUED | OUTPATIENT
Start: 2018-09-17 | End: 2018-09-18 | Stop reason: HOSPADM

## 2018-09-17 RX ORDER — LANOLIN ALCOHOL/MO/W.PET/CERES
1000 CREAM (GRAM) TOPICAL DAILY
Status: DISCONTINUED | OUTPATIENT
Start: 2018-09-17 | End: 2018-09-18 | Stop reason: HOSPADM

## 2018-09-17 RX ORDER — LANOLIN ALCOHOL/MO/W.PET/CERES
400 CREAM (GRAM) TOPICAL ONCE
Status: COMPLETED | OUTPATIENT
Start: 2018-09-17 | End: 2018-09-17

## 2018-09-17 RX ADMIN — Medication 1000 MCG: at 18:20

## 2018-09-17 RX ADMIN — OXYCODONE HYDROCHLORIDE 10 MG: 5 TABLET ORAL at 19:59

## 2018-09-17 RX ADMIN — Medication 5 MG/HR: at 08:08

## 2018-09-17 RX ADMIN — METOPROLOL TARTRATE 25 MG: 25 TABLET ORAL at 20:43

## 2018-09-17 RX ADMIN — OXYCODONE HYDROCHLORIDE 10 MG: 5 TABLET ORAL at 04:15

## 2018-09-17 RX ADMIN — VITAMIN D, TAB 1000IU (100/BT) 1000 UNITS: 25 TAB at 08:10

## 2018-09-17 RX ADMIN — OXYCODONE HYDROCHLORIDE 10 MG: 5 TABLET ORAL at 15:33

## 2018-09-17 RX ADMIN — NICOTINE 1 PATCH: 21 PATCH TRANSDERMAL at 15:33

## 2018-09-17 RX ADMIN — POTASSIUM CHLORIDE 20 MEQ: 750 TABLET, FILM COATED, EXTENDED RELEASE ORAL at 05:45

## 2018-09-17 RX ADMIN — MAGNESIUM OXIDE TAB 400 MG (240 MG ELEMENTAL MG) 400 MG: 400 (240 MG) TAB at 05:45

## 2018-09-17 RX ADMIN — PANTOPRAZOLE SODIUM 40 MG: 40 TABLET, DELAYED RELEASE ORAL at 08:11

## 2018-09-17 RX ADMIN — METOPROLOL TARTRATE 5 MG: 1 INJECTION, SOLUTION INTRAVENOUS at 10:44

## 2018-09-17 RX ADMIN — ACETAMINOPHEN 650 MG: 325 TABLET ORAL at 05:33

## 2018-09-17 RX ADMIN — Medication 2.5 MG/HR: at 01:05

## 2018-09-17 RX ADMIN — ACETAMINOPHEN 650 MG: 325 TABLET ORAL at 18:19

## 2018-09-17 RX ADMIN — ACETAMINOPHEN 650 MG: 325 TABLET ORAL at 13:22

## 2018-09-17 RX ADMIN — MULTIPLE VITAMINS W/ MINERALS TAB 1 TABLET: TAB at 18:19

## 2018-09-17 RX ADMIN — ATORVASTATIN CALCIUM 20 MG: 20 TABLET, FILM COATED ORAL at 20:00

## 2018-09-17 RX ADMIN — SODIUM CHLORIDE 1000 ML: 9 INJECTION, SOLUTION INTRAVENOUS at 04:30

## 2018-09-17 RX ADMIN — LIDOCAINE 1 PATCH: 246 PATCH TOPICAL at 08:10

## 2018-09-17 RX ADMIN — FLUOXETINE HYDROCHLORIDE 40 MG: 20 CAPSULE ORAL at 08:10

## 2018-09-17 RX ADMIN — IOHEXOL 125 ML: 300 INJECTION, SOLUTION INTRAVENOUS at 05:12

## 2018-09-17 RX ADMIN — MODAFINIL 100 MG: 100 TABLET ORAL at 18:20

## 2018-09-17 ASSESSMENT — COGNITIVE AND FUNCTIONAL STATUS - GENERAL
WALKING IN HOSPITAL ROOM: 3 - A LITTLE
HELP NEEDED FOR PERSONAL GROOMING: 4 - NONE
TOILETING: 3 - A LITTLE
HELP NEEDED FOR BATHING: 3 - A LITTLE
CLIMB 3 TO 5 STEPS WITH RAILING: 3 - A LITTLE
EATING MEALS: 4 - NONE
STANDING UP FROM CHAIR USING ARMS: 3 - A LITTLE
DRESSING REGULAR UPPER BODY CLOTHING: 3 - A LITTLE
MOVING TO AND FROM BED TO CHAIR: 3 - A LITTLE
DRESSING REGULAR LOWER BODY CLOTHING: 3 - A LITTLE
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
## 2018-09-17 NOTE — PLAN OF CARE
Problem: Patient Care Overview  Goal: Plan of Care Review  Outcome: Outcome(s) Achieved Date Met: 09/17/18 09/17/18 8248   Coping/Psychosocial   Plan Of Care Reviewed With patient   Plan of Care Review   Progress progress toward functional goals as expected   Outcome Summary patient requires min A for mobility, +R knee buckling. rec home with PT, RW, and family support at discharge.        Problem: Fall Risk (Adult)  Goal: Absence of Falls  Outcome: Ongoing (interventions implemented as appropriate)      Problem: Acute Therapy Services Goal & Intervention Plan  Goal: Bed Mobility Goal  Outcome: Ongoing (interventions implemented as appropriate)    Goal: Gait Training Goal  Outcome: Ongoing (interventions implemented as appropriate)    Goal: Stairs Goal  Outcome: Ongoing (interventions implemented as appropriate)    Goal: Transfer Training Goal  Outcome: Ongoing (interventions implemented as appropriate)

## 2018-09-17 NOTE — ASSESSMENT & PLAN NOTE
This is being managed nonsurgically.  Her blood pressure is being kept low.  She understands the importance of avoiding Valsalva, lifting, or straining.
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
## 2018-09-17 NOTE — ASSESSMENT & PLAN NOTE
She has abrasions of the left lower extremity but contusion of the right knee.  There is no evidence of fracture or significant internal derangement.  She is safe for ambulating with a walker and should use this for a few days.

## 2018-09-17 NOTE — PLAN OF CARE
Problem: Acute Therapy Services Goal & Intervention Plan  Goal: Occupational Therapy Goal  Outcome: Ongoing (interventions implemented as appropriate)
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
## 2018-09-18 ENCOUNTER — APPOINTMENT (INPATIENT)
Dept: RADIOLOGY | Facility: HOSPITAL | Age: 53
DRG: 300 | End: 2018-09-18
Attending: PHYSICIAN ASSISTANT
Payer: COMMERCIAL

## 2018-09-18 VITALS
HEART RATE: 71 BPM | TEMPERATURE: 98.2 F | DIASTOLIC BLOOD PRESSURE: 51 MMHG | SYSTOLIC BLOOD PRESSURE: 93 MMHG | OXYGEN SATURATION: 97 % | BODY MASS INDEX: 30 KG/M2 | RESPIRATION RATE: 20 BRPM | WEIGHT: 163 LBS | HEIGHT: 62 IN

## 2018-09-18 PROBLEM — R73.03 PREDIABETES: Status: ACTIVE | Noted: 2018-04-06

## 2018-09-18 PROBLEM — Z86.73 HISTORY OF STROKE: Status: ACTIVE | Noted: 2017-10-31

## 2018-09-18 LAB
ANION GAP SERPL CALC-SCNC: 9 MEQ/L (ref 3–15)
APTT PPP: 33 SEC (ref 23–35)
BACTERIA UR CULT: NORMAL
BUN SERPL-MCNC: 10 MG/DL (ref 8–20)
CALCIUM SERPL-MCNC: 8.9 MG/DL (ref 8.9–10.3)
CHLORIDE SERPL-SCNC: 106 MEQ/L (ref 98–109)
CO2 SERPL-SCNC: 22 MEQ/L (ref 22–32)
CREAT SERPL-MCNC: 0.9 MG/DL (ref 0.6–1.1)
ERYTHROCYTE [DISTWIDTH] IN BLOOD BY AUTOMATED COUNT: 14.2 % (ref 11.7–14.4)
GFR SERPL CREATININE-BSD FRML MDRD: >60 ML/MIN/1.73M*2
GLUCOSE SERPL-MCNC: 107 MG/DL (ref 70–99)
HCT VFR BLDCO AUTO: 35.1 % (ref 35–45)
HGB BLD-MCNC: 11.7 G/DL (ref 11.8–15.7)
INR PPP: 1 INR
MAGNESIUM SERPL-MCNC: 1.7 MG/DL (ref 1.8–2.5)
MCH RBC QN AUTO: 29.3 PG (ref 28–33.2)
MCHC RBC AUTO-ENTMCNC: 33.3 G/DL (ref 32.2–35.5)
MCV RBC AUTO: 88 FL (ref 83–98)
PDW BLD AUTO: 10.2 FL (ref 9.4–12.3)
PHOSPHATE SERPL-MCNC: 3.1 MG/DL (ref 2.4–4.7)
PLATELET # BLD AUTO: 230 K/UL (ref 150–369)
POTASSIUM SERPL-SCNC: 4.1 MEQ/L (ref 3.6–5.1)
PROTHROMBIN TIME: 13.2 SEC (ref 12.2–14.5)
RBC # BLD AUTO: 3.99 M/UL (ref 3.93–5.22)
SODIUM SERPL-SCNC: 137 MEQ/L (ref 136–144)
WBC # BLD AUTO: 18.27 K/UL (ref 3.8–10.5)

## 2018-09-18 PROCEDURE — 63600000 HC DRUGS/DETAIL CODE: Performed by: NURSE PRACTITIONER

## 2018-09-18 PROCEDURE — 63700000 HC SELF-ADMINISTRABLE DRUG: Performed by: PHYSICIAN ASSISTANT

## 2018-09-18 PROCEDURE — 85027 COMPLETE CBC AUTOMATED: CPT | Performed by: PHYSICIAN ASSISTANT

## 2018-09-18 PROCEDURE — 63700000 HC SELF-ADMINISTRABLE DRUG: Performed by: NURSE PRACTITIONER

## 2018-09-18 PROCEDURE — 97116 GAIT TRAINING THERAPY: CPT | Mod: GP

## 2018-09-18 PROCEDURE — 83735 ASSAY OF MAGNESIUM: CPT | Performed by: PHYSICIAN ASSISTANT

## 2018-09-18 PROCEDURE — 71045 X-RAY EXAM CHEST 1 VIEW: CPT

## 2018-09-18 PROCEDURE — 97530 THERAPEUTIC ACTIVITIES: CPT | Mod: GO

## 2018-09-18 PROCEDURE — 36415 COLL VENOUS BLD VENIPUNCTURE: CPT | Performed by: PHYSICIAN ASSISTANT

## 2018-09-18 PROCEDURE — 25000000 HC PHARMACY GENERAL: Performed by: NURSE PRACTITIONER

## 2018-09-18 PROCEDURE — 80048 BASIC METABOLIC PNL TOTAL CA: CPT | Performed by: PHYSICIAN ASSISTANT

## 2018-09-18 PROCEDURE — 85730 THROMBOPLASTIN TIME PARTIAL: CPT | Performed by: PHYSICIAN ASSISTANT

## 2018-09-18 PROCEDURE — 84100 ASSAY OF PHOSPHORUS: CPT | Performed by: PHYSICIAN ASSISTANT

## 2018-09-18 PROCEDURE — 85610 PROTHROMBIN TIME: CPT | Performed by: PHYSICIAN ASSISTANT

## 2018-09-18 RX ORDER — CLOPIDOGREL BISULFATE 75 MG/1
75 TABLET ORAL DAILY
Status: DISCONTINUED | OUTPATIENT
Start: 2018-09-18 | End: 2018-09-18 | Stop reason: HOSPADM

## 2018-09-18 RX ORDER — ACETAMINOPHEN 325 MG/1
650 TABLET ORAL EVERY 6 HOURS
Refills: 0
Start: 2018-09-18 | End: 2018-09-20

## 2018-09-18 RX ORDER — OXYCODONE HYDROCHLORIDE 10 MG/1
10 TABLET ORAL EVERY 4 HOURS PRN
Qty: 40 TABLET | Refills: 0 | Status: SHIPPED | OUTPATIENT
Start: 2018-09-18 | End: 2018-09-25

## 2018-09-18 RX ORDER — LIDOCAINE 560 MG/1
1 PATCH PERCUTANEOUS; TOPICAL; TRANSDERMAL DAILY
Refills: 0
Start: 2018-09-19 | End: 2018-10-19

## 2018-09-18 RX ORDER — MAGNESIUM SULFATE HEPTAHYDRATE 40 MG/ML
2 INJECTION, SOLUTION INTRAVENOUS ONCE
Status: COMPLETED | OUTPATIENT
Start: 2018-09-18 | End: 2018-09-18

## 2018-09-18 RX ORDER — DIPHENHYDRAMINE HCL 25 MG
25 CAPSULE ORAL ONCE
Status: COMPLETED | OUTPATIENT
Start: 2018-09-18 | End: 2018-09-18

## 2018-09-18 RX ORDER — METOPROLOL TARTRATE 25 MG/1
25 TABLET, FILM COATED ORAL 2 TIMES DAILY
Qty: 60 TABLET | Refills: 0 | Status: SHIPPED | OUTPATIENT
Start: 2018-09-18 | End: 2018-10-18

## 2018-09-18 RX ADMIN — NICOTINE 1 PATCH: 21 PATCH TRANSDERMAL at 09:32

## 2018-09-18 RX ADMIN — FLUOXETINE HYDROCHLORIDE 40 MG: 20 CAPSULE ORAL at 09:30

## 2018-09-18 RX ADMIN — CLOPIDOGREL BISULFATE 75 MG: 75 TABLET, FILM COATED ORAL at 09:31

## 2018-09-18 RX ADMIN — PANTOPRAZOLE SODIUM 40 MG: 40 TABLET, DELAYED RELEASE ORAL at 09:30

## 2018-09-18 RX ADMIN — MAGNESIUM SULFATE HEPTAHYDRATE 2 G: 40 INJECTION, SOLUTION INTRAVENOUS at 06:09

## 2018-09-18 RX ADMIN — Medication 1000 MCG: at 09:30

## 2018-09-18 RX ADMIN — METOPROLOL TARTRATE 25 MG: 25 TABLET ORAL at 09:30

## 2018-09-18 RX ADMIN — VITAMIN D, TAB 1000IU (100/BT) 1000 UNITS: 25 TAB at 09:30

## 2018-09-18 RX ADMIN — LIDOCAINE 1 PATCH: 246 PATCH TOPICAL at 09:31

## 2018-09-18 RX ADMIN — OXYCODONE HYDROCHLORIDE 10 MG: 5 TABLET ORAL at 02:11

## 2018-09-18 RX ADMIN — MULTIPLE VITAMINS W/ MINERALS TAB 1 TABLET: TAB at 10:55

## 2018-09-18 RX ADMIN — MODAFINIL 100 MG: 100 TABLET ORAL at 09:29

## 2018-09-18 RX ADMIN — OXYCODONE HYDROCHLORIDE 10 MG: 5 TABLET ORAL at 10:55

## 2018-09-18 RX ADMIN — ACETAMINOPHEN 650 MG: 325 TABLET ORAL at 12:26

## 2018-09-18 RX ADMIN — OXYCODONE HYDROCHLORIDE 10 MG: 5 TABLET ORAL at 06:08

## 2018-09-18 RX ADMIN — DIPHENHYDRAMINE HYDROCHLORIDE 25 MG: 25 CAPSULE ORAL at 15:15

## 2018-09-18 RX ADMIN — OXYCODONE HYDROCHLORIDE 10 MG: 5 TABLET ORAL at 15:09

## 2018-09-18 ASSESSMENT — COGNITIVE AND FUNCTIONAL STATUS - GENERAL
WALKING IN HOSPITAL ROOM: 3 - A LITTLE
HELP NEEDED FOR BATHING: 3 - A LITTLE
DRESSING REGULAR LOWER BODY CLOTHING: 3 - A LITTLE
HELP NEEDED FOR PERSONAL GROOMING: 4 - NONE
EATING MEALS: 4 - NONE
TOILETING: 3 - A LITTLE
STANDING UP FROM CHAIR USING ARMS: 3 - A LITTLE
MOVING TO AND FROM BED TO CHAIR: 3 - A LITTLE
DRESSING REGULAR UPPER BODY CLOTHING: 3 - A LITTLE
CLIMB 3 TO 5 STEPS WITH RAILING: 3 - A LITTLE
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
## 2018-09-18 NOTE — PROGRESS NOTES
[place] PDMP reviewed. No red flags identified. Safe to proceed with narcotic prescription. Patient last prescribed 60 percocet 10mg-325mg on 8/11/2018. All narcotics in past year from 1 prescriber, neurologist/pain management  Dr. Celestine Levine.

## 2018-09-18 NOTE — NURSING NOTE
Patient discharged to home via wheelchair, accompanied by PCT and patient's family.  Discharge instructions reviewed with patient and family.  All belongings sent home with patient.    Lorene Monroe RN
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
## 2018-09-18 NOTE — PROGRESS NOTES
[facility]: Referral received. Chart reviewed. Home Care referral completed for SN, PT and OT with [facility].

## 2018-09-18 NOTE — PROGRESS NOTES
As per MD Soriano, anticipate pt medically stable for dc home today. PT/OT following and recommend home with home health + walker. SW met with pt at bedside who is in agreement with dcp. Pt reports family will transport home today and she will stay with her brother as he has a 1st floor set up [[address]]; Contact will be pt mother/Melonie p: [phone]. Pt in agreement with home health at dc with preference for [facility]. SW offered pt inpt/outpt resources for ETOH use and pt declined both. [facility] ETOH form provided. Emotional support provided.     SW initiated referral with PADMINI/Michelle [phone] informed her pt will be dc to her brother's address. SW continues to follow for emotional support and dispo planning. Otilia Castañeda, VANIA

## 2018-09-18 NOTE — PLAN OF CARE
Problem: Patient Care Overview  Goal: Plan of Care Review  Outcome: Outcome(s) Achieved Date Met: 09/18/18 09/18/18 1544   Coping/Psychosocial   Plan Of Care Reviewed With patient;family   Plan of Care Review   Progress improving   Outcome Summary Patient is being discharged to home today.       Problem: Fall Risk (Adult)  Goal: Identify Related Risk Factors and Signs and Symptoms  Outcome: Outcome(s) Achieved Date Met: 09/18/18    Goal: Absence of Falls  Outcome: Outcome(s) Achieved Date Met: 09/18/18

## 2018-09-18 NOTE — DISCHARGE INSTRUCTIONS
PLAN  1. Vascular in 4 weeks with Dr. Hauser after you have a CTA of your chest. [phone]  2.  Follow up with primary care provider on Thursday 9/20/2018 to have your blood pressure checked, goal is systolic BP between 100 and 120 due to aortic dissection  3. Follow up in trauma office as needed       Incidental Finding  Patient:  Shayla Lawrence  YOB: 1965  Study Performed:  CT scan Abdomen/Pelvis with IV contrast    What is an incidental finding? An incidental finding is a per chance discovery in a patient which may warrant a further investigation.     Incidental finding(s) during your hospital visit:   - A 14 mm right UPJ obstructing calculus, likely chronic, including severe  right hydronephrosis, and mild renal cortical thinning.  - An additional 7 mm calculus in the mid right sacral ureter.    It is important to follow-up with your Primary Physician with these findings.    Rib Fracture  A rib fracture is a break or crack in one of the bones of the ribs. The ribs are like a cage that goes around your upper chest. A broken or cracked rib is often painful, but most do not cause other problems. Most rib fractures heal on their own in 1-3 months.  Follow these instructions at home:  · Avoid activities that cause pain to the injured area. Protect your injured area.  · Slowly increase activity as told by your doctor.  · Take medicine as told by your doctor.  · Put ice on the injured area for the first 1-2 days after you have been treated or as told by your doctor.  ¨ Put ice in a plastic bag.  ¨ Place a towel between your skin and the bag.  ¨ Leave the ice on for 15-20 minutes at a time, every 2 hours while you are awake.  · Do deep breathing as told by your doctor. You may be told to:  ¨ Take deep breaths many times a day.  ¨ Cough many times a day while hugging a pillow.  ¨ Use a device (incentive spirometer) to perform deep breathing many times a day.  · Drink enough fluids to keep your pee  (urine) clear or pale yellow.  · Do not wear a rib belt or binder. These do not allow you to breathe deeply.  Get help right away if:  · You have a fever.  · You have trouble breathing.  · You cannot stop coughing.  · You cough up thick or bloody spit (mucus).  · You feel sick to your stomach (nauseous), throw up (vomit), or have belly (abdominal) pain.  · Your pain gets worse and medicine does not help.  This information is not intended to replace advice given to you by your health care provider. Make sure you discuss any questions you have with your health care provider.  Document Released: 09/26/2009 Document Revised: 05/25/2017 Document Reviewed: 02/19/2014  [employer] Interactive Patient Education © 2018 [employer]    Incentive Spirometer  An incentive spirometer is a tool that measures how well you are filling your lungs with each breath. This tool can help keep your lungs clear and active. Taking long, deep breaths may help reverse or decrease the chance of developing breathing (pulmonary) problems, especially infection, following:  · Surgery of the chest or abdomen.  · Surgery if you have a history of smoking or a lung problem.  · A long period of time when you are unable to move or be active.  If the spirometer includes an indicator to show your best effort, your health care provider or respiratory therapist will help you set a goal. Keep a log of your progress if directed by your health care provider.  What are the risks?  · Breathing too quickly may cause dizziness or cause you to pass out. Take your time so you do not get dizzy or lightheaded.  · If you are in pain, you may need to take or ask for pain medicine before doing incentive spirometry. It is harder to take a deep breath if you are having pain.  How to use your incentive spirometer  1. Sit on the edge of your bed if possible, or sit up as far as you can in bed or on a chair.  2. Hold the incentive spirometer in an upright position.  3. Breathe  out normally.  4. Place the mouthpiece in your mouth and seal your lips tightly around it.  5. Breathe in slowly and as deeply as possible, raising the piston or the ball toward the top of the column.  6. Hold your breath for 3-5 seconds or for as long as possible. Allow the piston or ball to fall to the bottom of the column.  7. Remove the mouthpiece from your mouth and breathe out normally.  8. The spirometer may include an indicator to show your best effort. Use the indicator as a goal to work toward during each repetition.  9. Rest for a few seconds and repeat this at least 10 times, every 1-2 hours when you are awake. Take your time and take a few normal breaths between deep breaths. Breathing too quickly may cause dizziness or cause you to pass out. Take your time so you do not get dizzy or lightheaded.  10. After each set of 10 deep breaths, practice coughing to be sure your lungs are clear. If you had a surgical cut (incision) made during surgery, support your incision when coughing by placing a pillow or rolled-up towel firmly against it.  Once you are able to get out of bed, walk around indoors and cough well. You may stop using the incentive spirometer when instructed by your health care provider.  Contact a health care provider if:  · You are having difficulty using the spirometer.  · You have trouble using the spirometer as often as instructed.  · Your pain medicine is not giving enough relief while using the spirometer.  · You have a fever.  · You develop shortness of breath.  Get help right away if:  · You develop a cough with bloody sputum.  · You develop worsening pain, redness, or discharge at or near the incision site.  This information is not intended to replace advice given to you by your health care provider. Make sure you discuss any questions you have with your health care provider.  Document Released: 04/29/2008 Document Revised: 09/11/2017 Document Reviewed: 07/27/2015  [employer]  Patient Education © 2018 [employer]      Liver Laceration  A liver laceration is a tear or a cut in the liver. The liver is an organ that is involved in many important bodily functions. Sometimes, a liver laceration can be a very serious injury. It can cause a lot of bleeding, and surgery may be needed. Other times, a liver laceration may be minor, and bed rest may be all that is needed. Either way, treatment in a hospital is almost always required.  Liver lacerations are categorized in grades from 1 to 5. Low numbers identify lacerations that are less severe than lacerations with high numbers.  · Grade 1: This is a tear in the outer lining of the liver. It is less than ½ inch (1 cm) deep.  · Grade 2: This is a tear that is about ½ inch to 1 inch (1 to 3 cm) deep. It is less than 4 inches (10 cm) long.  · Grade 3: This is a tear that is slightly more than 1 inch (3 cm) deep.  · Grades 4 and 5: These lacerations are very deep. They affect a large part of the liver.  What are the causes?  This condition may be caused by:  · A forceful hit to the area around the liver (blunt trauma), such as in a car crash. Blunt trauma can tear the liver even though it does not break the skin.  · An injury in which an object goes through the skin and into the liver (penetrating injury), such as a stab or gunshot wound.  What are the signs or symptoms?  Common symptoms of this condition include:  · A swollen and firm abdomen.  · Pain in the abdomen.  · Tenderness when pressing on the right side of the abdomen.  Other symptoms include:  · Bleeding from a penetrating wound.  · Bruises on the abdomen.  · A fast heartbeat.  · Taking quick breaths.  · Feeling weak and dizzy.  How is this diagnosed?  To diagnose this condition, your health care provider will do a physical exam and ask about any injuries to the right side of your abdomen. You may have various tests, such as:  · Blood tests. Your blood may be tested every few hours. This  will show whether you are losing blood.  · CT scan. This test is done to check for laceration or bleeding.  · Laparoscopy. This involves placing a small camera into the abdomen and looking directly at the surface of the liver.  How is this treated?  Treatment depends on how deep the laceration is and how much bleeding you have. Treatment options include:  · Monitoring and bed rest at the hospital. You will have tests often.  · Receiving donated blood through an IV tube (transfusion) to replace blood that you have lost. You may need several transfusions.  · Surgery to pack gauze pads or special material around the laceration to help it heal or to repair the laceration.  Follow these instructions at home:  · Take over-the-counter and prescription medicines only as told by your health care provider. Do not take any other medicines unless you ask your health care provider about them first.  · Do not drive or use heavy machinery while taking prescription pain medicines.  · Rest and limit your activity as told by your health care provider. It may be several months before you can return to your usual routine. Do not participate in activities that involve physical contact or require extra energy until your health care provider approves.  · Keep all follow-up visits as told by your health care provider. This is important.  Contact a health care provider if:  · Your abdominal pain does not go away.  · You feel more weak and tired than usual.  Get help right away if:  · Your abdominal pain gets worse.  · You have a cut on your skin that:  ¨ Has more redness, swelling, or pain around it.  ¨ Has more fluid or blood coming from it.  ¨ Feels warm to the touch.  ¨ Has pus or a bad smell coming from it.  · You feel dizzy or very weak.  · You have trouble breathing.  · You have a fever.  This information is not intended to replace advice given to you by your health care provider. Make sure you discuss any questions you have with your  health care provider.  Document Released: 01/20/2012 Document Revised: 08/04/2017 Document Reviewed: 08/04/2017  [employer] Interactive Patient Education © 2018 [employer]    Aortic Dissection  An aortic dissection happens when there is a tear in the main blood vessel of the body (aorta). The aorta comes out of the heart, curves around, and then goes down the chest (thoracic aorta) and into the abdomen (abdominal aorta) to supply arteries with blood. The wall of the aorta has inner and outer layers.  Aortic dissection occurs most often in the thoracic aorta. As the tear widens and blood flows through it, the aorta becomes “double-barreled.” This means that one part of the aorta continues to carry blood to the body, but blood also flows into the tear, between the layers of the aorta. The torn part of the aorta fills with blood and swells up. This can reduce blood flow through the part of the aorta that is still supplying blood to the body. Aortic dissection is a medical emergency.  What are the causes?  An aortic dissection is commonly caused by weakening of the artery wall due to high blood pressure. Other causes may include:  · An injury, such as from a car crash.  · Birth defects that affect the heart (congenital heart defects).  · Thickening of the artery walls.  In some cases, the cause is not known.  What increases the risk?  The following factors may make you more likely to develop this condition:  · Having certain medical conditions, such as:  ¨ High blood pressure (hypertension).  ¨ Hardening and narrowing of the arteries (atherosclerosis).  ¨ A genetic disorder that affects the connective tissue, such as Marfan syndrome or Radha-Danlos syndrome.  ¨ A condition that causes inflammation of blood vessels, such as giant cell arteritis.  · Having a chest injury.  · Having surgery on the aorta.  · Being born with a congenital heart defect.  · Being male.  · Being older than age 60.  · Using  cocaine.  · Smoking.  · Lifting heavy weights or doing other types of high-intensity resistance training.  What are the signs or symptoms?  Signs and symptoms of aortic dissection start suddenly. The most common symptoms are:  · Severe chest pain that may feel like tearing, stabbing, or sharp pain.  · Severe pain that spreads (radiates) to the back, neck, jaw, or abdomen.  Other symptoms may include:  · Trouble breathing.  · Dizziness or fainting.  · Sudden weakness on one side of the body.  · Nausea or vomiting.  · Trouble swallowing.  · Coughing up blood.  · Vomiting blood.  · Clammy skin.  How is this diagnosed?  This condition may be diagnosed based on:  · Your symptoms.  · A physical exam. This may include:  ¨ Listening for abnormal blood flow sounds (murmurs) in your chest or abdomen.  ¨ Checking your pulse in your arms and legs.  ¨ Checking your blood pressure to see whether it is low or whether there is a difference between the measurements in your right and left arm.  · Electrocardiogram (ECG). This test measures the electrical activity in your heart.  · Chest X-ray.  · CT scan.  · MRI.  · Aortic angiogram. This test involves injecting dye to make it easier to see your blood vessels clearly.  · Echocardiogram to study your heart using sound waves.  · Blood tests.  How is this treated?  It is important to treat an aortic dissection as quickly as possible. Treatment may start as soon as your health care provider thinks that you have aortic dissection. Treatment depends on the location and severity of your dissection and your overall health. Treatment may include:  · Medicines to lower your blood pressure.  · Surgery to repair the dissected part of your aorta with artificial material (syntheticgraft).  · A medical procedure to insert a stent-graft into the aorta (endovascular procedure). During this procedure, a long, thin tube (stent) is inserted into an artery near the groin (femoral artery) and moved up to  the damaged part of the aorta. Then, the stent is opened to help improve blood flow and prevent future dissection.  Follow these instructions at home:  Activity  · Avoid activities that could injure your chest or your abdomen. Ask your health care provider what activities are safe for you.  · After you have recovered, try to stay active. Ask your health care provider what activities are safe for you after recovery.  · Do not lift anything that is heavier than 10 lb (4.5 kg) until your health care provider approves.  · Do not drive or use heavy machinery while taking prescription pain medicine.  Eating and drinking  · Eat a heart-healthy diet, which includes lots of fresh fruits and vegetables, low-fat (lean) protein, and whole grains.  · Check ingredients and nutrition facts on packaged foods and beverages, and avoid foods with high amounts of:  ¨ Salt (sodium).  ¨ Saturated fats (like red meat).  ¨ Trans fats (like fried food).  General instructions  · Take over-the-counter and prescription medicines only as told by your health care provider.  · Work with your health care provider to manage your blood pressure.  · Talk with your health care provider about how to manage stress.  · Do not use any products that contain nicotine or tobacco, such as cigarettes and e-cigarettes. If you need help quitting, ask your health care provider.  · Keep all follow-up visits as told by your health care provider. This is important.  Get help right away if:  · You develop any symptoms of aortic dissection after treatment, including severe pain in your chest, back, or abdomen.  · You have a pain in your abdomen.  · You have trouble breathing or you develop a cough.  · You faint.  · You develop a racing heartbeat.  These symptoms may represent a serious problem that is an emergency. Do not wait to see if the symptoms will go away. Get medical help right away. Call your local emergency services (911 in the [place] Do not drive yourself  to the hospital.  Summary  · An aortic dissection happens when there is a tear in the main blood vessel of the body (aorta). It is a medical emergency.  · The most common symptom is severe pain in the chest that spreads (radiates) to the back, neck, jaw, or abdomen.  · It is important to treat an aortic dissection as quickly as possible. Treatment typically includes surgery and medicines.  This information is not intended to replace advice given to you by your health care provider. Make sure you discuss any questions you have with your health care provider.  Document Released: 03/26/2009 Document Revised: 11/06/2017 Document Reviewed: 11/06/2017  [employer] Interactive Patient Education © 2017 [employer]      Motor Vehicle Collision Injury  It is common to have injuries to your face, arms, and body after a motor vehicle collision. These injuries may include cuts, burns, bruises, and sore muscles. These injuries tend to feel worse for the first 24-48 hours. You may have the most stiffness and soreness over the first several hours. You may also feel worse when you wake up the first morning after your collision. In the days that follow, you will usually begin to improve with each day. How quickly you improve often depends on the severity of the collision, the number of injuries you have, the location and nature of these injuries, and whether your airbag deployed.  Follow these instructions at home:  Medicines  · Take and apply over-the-counter and prescription medicines only as told by your health care provider.  · If you were prescribed antibiotic medicine, take or apply it as told by your health care provider. Do not stop using the antibiotic even if your condition improves.  If You Have a Wound or a Burn:  · Clean your wound or burn as told by your health care provider.  ¨ Wash the wound or burn with mild soap and water.  ¨ Rinse the wound or burn with water to remove all soap.  ¨ Pat the wound or burn dry with a  clean towel. Do not rub it.  · Follow instructions from your health care provider about how to take care of your wound or burn. Make sure you:  ¨ Know when and how to change your bandage (dressing). Always wash your hands with soap and water before you change your dressing. If soap and water are not available, use hand .  ¨ Leave stitches (sutures), skin glue, or adhesive strips in place, if this applies. These skin closures may need to stay in place for 2 weeks or longer. If adhesive strip edges start to loosen and curl up, you may trim the loose edges. Do not remove adhesive strips completely unless your health care provider tells you to do that.  ¨ Know when you should remove your dressing.  · Do not scratch or pick at the wound or burn.  · Do not break any blisters you may have. Do not peel any skin.  · Avoid exposing your burn or wound to the sun.  · Raise (elevate) the wound or burn above the level of your heart while you are sitting or lying down. If you have a wound or burn on your face, you may want to sleep with your head elevated. You may do this by putting an extra pillow under your head.  · Check your wound or burn every day for signs of infection. Watch for:  ¨ Redness, swelling, or pain.  ¨ Fluid, blood, or pus.  ¨ Warmth.  ¨ A bad smell.  General instructions  · Apply ice to your eyes, face, torso, or other injured areas as told by your health care provider. This can help with pain and swelling.  ¨ Put ice in a plastic bag.  ¨ Place a towel between your skin and the bag.  ¨ Leave the ice on for 20 minutes, 2-3 times a day.  · Drink enough fluid to keep your urine clear or pale yellow.  · Do not drink alcohol.  · Ask your health care provider if you have any lifting restrictions. Lifting can make neck or back pain worse, if this applies.  · Rest. Rest helps your body to heal. Make sure you:  ¨ Get plenty of sleep at night. Avoid staying up late at night.  ¨ Keep the same bedtime hours on  weekends and weekdays.  · Ask your health care provider when you can drive, ride a bicycle, or operate heavy machinery. Your ability to react may be slower if you injured your head. Do not do these activities if you are dizzy.  Contact a health care provider if:  · Your symptoms get worse.  · You have any of the following symptoms for more than two weeks after your motor vehicle collision:  ¨ Lasting (chronic) headaches.  ¨ Dizziness or balance problems.  ¨ Nausea.  ¨ Vision problems.  ¨ Increased sensitivity to noise or light.  ¨ Depression or mood swings.  ¨ Anxiety or irritability.  ¨ Memory problems.  ¨ Difficulty concentrating or paying attention.  ¨ Sleep problems.  ¨ Feeling tired all the time.  Get help right away if:  · You have:  ¨ Numbness, tingling, or weakness in your arms or legs.  ¨ Severe neck pain, especially tenderness in the middle of the back of your neck.  ¨ Changes in bowel or bladder control.  ¨ Increasing pain in any area of your body.  ¨ Shortness of breath or light-headedness.  ¨ Chest pain.  ¨ Blood in your urine, stool, or vomit.  ¨ Severe pain in your abdomen or your back.  ¨ Severe or worsening headaches.  ¨ Sudden vision loss or double vision.  · Your eye suddenly becomes red.  · Your pupil is an odd shape or size.  This information is not intended to replace advice given to you by your health care provider. Make sure you discuss any questions you have with your health care provider.  Document Released: 12/18/2006 Document Revised: 05/22/2017 Document Reviewed: 07/01/2016  [employer] Interactive Patient Education © 2018 [employer]        Please call the TRAUMA OFFICE as needed:   Dr. Tre Washington   Department of Trauma  Phone: [phone]  [address] [place] [address]  [employer] [facility] [place] [place] [address]  [place] [address]

## 2018-09-18 NOTE — PROGRESS NOTES
Trauma Attg    Patient seen and examined.  No new complaints.  No overnight events.  Pain reasonably controlled. Maintaining IS effort.    VSS  WBC 18 (15); Afebrile.  Hgb stable.  Hypomagnesemia - repleted.    NAD  CTA.   RRR.  SNTND    A/P  Continue multimodal pain management. IS. Chest PT.  F/U CTA with vascular as outpatient. Continue BB.   Resume Plavix.  F/U UCx sent on admission. Asymptomatic.  Plan d/c home today.    Jerardo Soriano MD

## 2018-09-18 NOTE — PROGRESS NOTES
Patient seen and examined. No new complaints. Pain well tolerated.    , reports being able to get to 1L.     Discharge education reviewed about IS, smoking cessation, follow up, and medications.    Emotional support provided, questions answered.

## 2018-09-18 NOTE — PLAN OF CARE
Problem: Patient Care Overview  Goal: Plan of Care Review  Outcome: Ongoing (interventions implemented as appropriate)   09/18/18 9307   Coping/Psychosocial   Plan Of Care Reviewed With patient;spouse   Plan of Care Review   Progress progress toward functional goals as expected   Outcome Summary patient is supervision level for mobility. issued RW for home use today. rec home with PT and family support today.        Problem: Fall Risk (Adult)  Goal: Absence of Falls  Outcome: Ongoing (interventions implemented as appropriate)

## 2018-09-20 LAB
CROSSMATCH: NORMAL
ISBT CODE: 600
PRODUCT CODE: NORMAL
PRODUCT STATUS: NORMAL
SPECIMEN EXP DATE BLD: NORMAL
UNIT ABO: NORMAL
UNIT ID: NORMAL
UNIT RH: NEGATIVE

## 2018-10-09 ENCOUNTER — TELEPHONE (OUTPATIENT)
Dept: TRANSFER UNIT | Age: 53
End: 2018-10-09

## 2018-10-22 ENCOUNTER — OFFICE VISIT (OUTPATIENT)
Dept: VASCULAR SURGERY | Facility: CLINIC | Age: 53
End: 2018-10-22
Payer: COMMERCIAL

## 2018-10-22 VITALS — DIASTOLIC BLOOD PRESSURE: 70 MMHG | RESPIRATION RATE: 15 BRPM | SYSTOLIC BLOOD PRESSURE: 110 MMHG

## 2018-10-22 DIAGNOSIS — I71.019 DISSECTION OF THORACIC AORTA (CMS/HCC): Primary | ICD-10-CM

## 2018-10-22 PROCEDURE — 99214 OFFICE O/P EST MOD 30 MIN: CPT | Performed by: SURGERY
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
## 2018-10-22 NOTE — ASSESSMENT & PLAN NOTE
Check results of the patient's CAT scan this upcoming week.  Follow-up in the office to discuss pseudoaneurysm appearance and/or thoracic aortic dissection.  Avoid tobacco products.  Antiplatelet agents.  The patient was instructed to not drive when she is on narcotics.
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
## 2018-10-24 ENCOUNTER — HOSPITAL ENCOUNTER (OUTPATIENT)
Dept: RADIOLOGY | Facility: HOSPITAL | Age: 53
Discharge: HOME | End: 2018-10-24
Attending: NURSE PRACTITIONER
Payer: COMMERCIAL

## 2018-10-24 DIAGNOSIS — I71.019 DISSECTION OF THORACIC AORTA (CMS/HCC): ICD-10-CM

## 2018-10-24 PROCEDURE — 71275 CT ANGIOGRAPHY CHEST: CPT

## 2018-10-24 PROCEDURE — 63600105 HC IODINE BASED CONTRAST: Performed by: NURSE PRACTITIONER

## 2018-10-24 RX ADMIN — IOHEXOL 100 ML: 350 INJECTION, SOLUTION INTRAVENOUS at 12:54

## 2018-10-29 ENCOUNTER — OFFICE VISIT (OUTPATIENT)
Dept: VASCULAR SURGERY | Facility: CLINIC | Age: 53
End: 2018-10-29
Payer: COMMERCIAL

## 2018-10-29 DIAGNOSIS — I71.019 DISSECTION OF THORACIC AORTA (CMS/HCC): Primary | ICD-10-CM

## 2018-10-29 PROCEDURE — 99214 OFFICE O/P EST MOD 30 MIN: CPT | Performed by: SURGERY

## 2018-10-29 NOTE — ASSESSMENT & PLAN NOTE
The repeat CAT scan of the chest was reviewed.  This demonstrates a pseudoaneurysm with a small thoracic aortic dissection.  Hopefully this will resolve.  We will continue to watch and repeat a CAT scan in 6 months time.  The patient was counseled to avoid tobacco products.  She should be maintained on antiplatelet agents.  She will follow-up with her primary care in regards to her MS and other chronic conditions.  If unchanged appearance in 6 months or if worsening of dissection and/or pseudoaneurysm, may need to consider carotid to subclavian artery bypass with thoracic aortic stent graft.
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
## 2019-07-25 DIAGNOSIS — I71.019 DISSECTION OF THORACIC AORTA (CMS/HCC): Primary | ICD-10-CM

## 2019-08-14 ENCOUNTER — APPOINTMENT (OUTPATIENT)
Dept: LAB | Facility: HOSPITAL | Age: 54
End: 2019-08-14
Attending: SURGERY
Payer: COMMERCIAL

## 2019-08-14 DIAGNOSIS — I71.019 DISSECTION OF THORACIC AORTA (CMS/HCC): ICD-10-CM

## 2019-08-14 LAB
BUN SERPL-MCNC: 11 MG/DL (ref 8–20)
CREAT SERPL-MCNC: 1.1 MG/DL
GFR SERPL CREATININE-BSD FRML MDRD: 51.8 ML/MIN/1.73M*2

## 2019-08-14 PROCEDURE — 84520 ASSAY OF UREA NITROGEN: CPT

## 2019-08-14 PROCEDURE — 82565 ASSAY OF CREATININE: CPT

## 2019-08-14 PROCEDURE — 36415 COLL VENOUS BLD VENIPUNCTURE: CPT

## 2019-08-24 ENCOUNTER — HOSPITAL ENCOUNTER (OUTPATIENT)
Dept: RADIOLOGY | Facility: HOSPITAL | Age: 54
Discharge: HOME | End: 2019-08-24
Attending: SURGERY
Payer: COMMERCIAL

## 2019-08-24 DIAGNOSIS — I71.019 DISSECTION OF THORACIC AORTA (CMS/HCC): ICD-10-CM

## 2019-08-24 PROCEDURE — 71275 CT ANGIOGRAPHY CHEST: CPT

## 2019-08-24 PROCEDURE — 63600105 HC IODINE BASED CONTRAST: Performed by: SURGERY

## 2019-08-24 RX ADMIN — IOHEXOL 100 ML: 350 INJECTION, SOLUTION INTRAVENOUS at 13:23

## 2019-09-16 ENCOUNTER — OFFICE VISIT (OUTPATIENT)
Dept: VASCULAR SURGERY | Facility: CLINIC | Age: 54
End: 2019-09-16
Payer: COMMERCIAL

## 2019-09-16 VITALS — DIASTOLIC BLOOD PRESSURE: 83 MMHG | SYSTOLIC BLOOD PRESSURE: 130 MMHG

## 2019-09-16 DIAGNOSIS — I71.019 DISSECTION OF THORACIC AORTA (CMS/HCC): Primary | ICD-10-CM

## 2019-09-16 PROCEDURE — 99214 OFFICE O/P EST MOD 30 MIN: CPT | Performed by: SURGERY

## 2019-09-16 NOTE — ASSESSMENT & PLAN NOTE
Will recommend continued surveillance of the small for pseudoaneurysm in her thoracic aorta.  Repeat CAT scan in 2 years.  She was counseled to continue to avoid tobacco agents and watch her blood pressure carefully.  She continues to have stable multiple sclerosis and other medical conditions.  Patient also has a history of a an occluded right common carotid artery.  Will need close surveillance of her carotids Bilaterally to ensure that there is no progression of atherosclerotic disease in either carotid system.  We will recheck ultrasound in 3 months and again at 2 years.
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
## 2020-01-24 DIAGNOSIS — I65.23 CAROTID STENOSIS, BILATERAL: Primary | ICD-10-CM

## 2022-01-19 ENCOUNTER — APPOINTMENT (RX ONLY)
Dept: URBAN - METROPOLITAN AREA CLINIC 28 | Facility: CLINIC | Age: 57
Setting detail: DERMATOLOGY
End: 2022-01-19

## 2022-01-19 DIAGNOSIS — L82.0 INFLAMED SEBORRHEIC KERATOSIS: ICD-10-CM

## 2022-01-19 PROCEDURE — ? BENIGN DESTRUCTION

## 2022-01-19 PROCEDURE — ? COUNSELING

## 2022-01-19 PROCEDURE — 17110 DESTRUCTION B9 LES UP TO 14: CPT

## 2022-01-19 ASSESSMENT — LOCATION ZONE DERM: LOCATION ZONE: AXILLAE

## 2022-01-19 ASSESSMENT — LOCATION SIMPLE DESCRIPTION DERM: LOCATION SIMPLE: RIGHT AXILLARY VAULT

## 2022-01-19 ASSESSMENT — LOCATION DETAILED DESCRIPTION DERM: LOCATION DETAILED: RIGHT AXILLARY VAULT

## 2022-01-19 NOTE — PROCEDURE: BENIGN DESTRUCTION
Consent: The patient's consent was obtained including but not limited to risks of crusting, scabbing, blistering, scarring, darker or lighter pigmentary change, recurrence, incomplete removal and infection.
Post-Care Instructions: I reviewed with the patient in detail post-care instructions. Patient is to wear sunprotection, and avoid picking at any of the treated lesions. Pt may apply Vaseline to crusted or scabbing areas.
Medical Necessity Clause: This procedure was medically necessary because the lesions that were treated were:
Include Z78.9 (Other Specified Conditions Influencing Health Status) As An Associated Diagnosis?: No
Treatment Number (Will Not Render If 0): 1
Medical Necessity Information: It is in your best interest to select a reason for this procedure from the list below. All of these items fulfill various CMS LCD requirements except the new and changing color options.
Detail Level: Detailed

## 2022-05-30 ENCOUNTER — HOSPITAL ENCOUNTER (EMERGENCY)
Facility: HOSPITAL | Age: 57
Discharge: HOME | End: 2022-05-31
Attending: EMERGENCY MEDICINE
Payer: MEDICARE

## 2022-05-30 ENCOUNTER — APPOINTMENT (EMERGENCY)
Dept: RADIOLOGY | Facility: HOSPITAL | Age: 57
End: 2022-05-30
Attending: EMERGENCY MEDICINE
Payer: MEDICARE

## 2022-05-30 DIAGNOSIS — J20.9 ACUTE BRONCHITIS, UNSPECIFIED ORGANISM: Primary | ICD-10-CM

## 2022-05-30 DIAGNOSIS — Z72.0 TOBACCO ABUSE: ICD-10-CM

## 2022-05-30 LAB
ANION GAP SERPL CALC-SCNC: 8 MEQ/L (ref 3–15)
APTT PPP: 30 SEC (ref 23–35)
BASOPHILS # BLD: 0.1 K/UL (ref 0.01–0.1)
BASOPHILS NFR BLD: 0.7 %
BUN SERPL-MCNC: 15 MG/DL (ref 8–20)
CALCIUM SERPL-MCNC: 9.6 MG/DL (ref 8.9–10.3)
CHLORIDE SERPL-SCNC: 104 MEQ/L (ref 98–109)
CO2 SERPL-SCNC: 29 MEQ/L (ref 22–32)
CREAT SERPL-MCNC: 1.3 MG/DL (ref 0.6–1.1)
DIFFERENTIAL METHOD BLD: ABNORMAL
EOSINOPHIL # BLD: 0.14 K/UL (ref 0.04–0.36)
EOSINOPHIL NFR BLD: 1 %
ERYTHROCYTE [DISTWIDTH] IN BLOOD BY AUTOMATED COUNT: 14.4 % (ref 11.7–14.4)
GFR SERPL CREATININE-BSD FRML MDRD: 42.2 ML/MIN/1.73M*2
GLUCOSE SERPL-MCNC: 129 MG/DL (ref 70–99)
HCT VFR BLDCO AUTO: 44.7 % (ref 35–45)
HGB BLD-MCNC: 15 G/DL (ref 11.8–15.7)
IMM GRANULOCYTES # BLD AUTO: 0.05 K/UL (ref 0–0.08)
IMM GRANULOCYTES NFR BLD AUTO: 0.4 %
INR PPP: 1
LYMPHOCYTES # BLD: 2.69 K/UL (ref 1.2–3.5)
LYMPHOCYTES NFR BLD: 20 %
MCH RBC QN AUTO: 29.8 PG (ref 28–33.2)
MCHC RBC AUTO-ENTMCNC: 33.6 G/DL (ref 32.2–35.5)
MCV RBC AUTO: 88.7 FL (ref 83–98)
MONOCYTES # BLD: 1.17 K/UL (ref 0.28–0.8)
MONOCYTES NFR BLD: 8.7 %
NEUTROPHILS # BLD: 9.33 K/UL (ref 1.7–7)
NEUTS SEG NFR BLD: 69.2 %
NRBC BLD-RTO: 0 %
PDW BLD AUTO: 9.9 FL (ref 9.4–12.3)
PLATELET # BLD AUTO: 281 K/UL (ref 150–369)
POTASSIUM SERPL-SCNC: 4.4 MEQ/L (ref 3.6–5.1)
PROTHROMBIN TIME: 12.6 SEC (ref 12.2–14.5)
RBC # BLD AUTO: 5.04 M/UL (ref 3.93–5.22)
SARS-COV-2 RNA RESP QL NAA+PROBE: NEGATIVE
SODIUM SERPL-SCNC: 141 MEQ/L (ref 136–144)
TROPONIN I SERPL HS-MCNC: 3.2 PG/ML
WBC # BLD AUTO: 13.48 K/UL (ref 3.8–10.5)

## 2022-05-30 PROCEDURE — 85730 THROMBOPLASTIN TIME PARTIAL: CPT | Performed by: PHYSICIAN ASSISTANT

## 2022-05-30 PROCEDURE — 99284 EMERGENCY DEPT VISIT MOD MDM: CPT | Mod: 25

## 2022-05-30 PROCEDURE — 63600000 HC DRUGS/DETAIL CODE: Performed by: EMERGENCY MEDICINE

## 2022-05-30 PROCEDURE — 80048 BASIC METABOLIC PNL TOTAL CA: CPT | Performed by: EMERGENCY MEDICINE

## 2022-05-30 PROCEDURE — 63700000 HC SELF-ADMINISTRABLE DRUG: Performed by: EMERGENCY MEDICINE

## 2022-05-30 PROCEDURE — 3E03329 INTRODUCTION OF OTHER ANTI-INFECTIVE INTO PERIPHERAL VEIN, PERCUTANEOUS APPROACH: ICD-10-PCS | Performed by: EMERGENCY MEDICINE

## 2022-05-30 PROCEDURE — 71045 X-RAY EXAM CHEST 1 VIEW: CPT

## 2022-05-30 PROCEDURE — 85025 COMPLETE CBC W/AUTO DIFF WBC: CPT | Performed by: EMERGENCY MEDICINE

## 2022-05-30 PROCEDURE — 84484 ASSAY OF TROPONIN QUANT: CPT | Performed by: EMERGENCY MEDICINE

## 2022-05-30 PROCEDURE — 25000000 HC PHARMACY GENERAL: Performed by: EMERGENCY MEDICINE

## 2022-05-30 PROCEDURE — 84484 ASSAY OF TROPONIN QUANT: CPT | Mod: 91 | Performed by: EMERGENCY MEDICINE

## 2022-05-30 PROCEDURE — 96365 THER/PROPH/DIAG IV INF INIT: CPT

## 2022-05-30 PROCEDURE — 85610 PROTHROMBIN TIME: CPT | Performed by: PHYSICIAN ASSISTANT

## 2022-05-30 PROCEDURE — U0003 INFECTIOUS AGENT DETECTION BY NUCLEIC ACID (DNA OR RNA); SEVERE ACUTE RESPIRATORY SYNDROME CORONAVIRUS 2 (SARS-COV-2) (CORONAVIRUS DISEASE [COVID-19]), AMPLIFIED PROBE TECHNIQUE, MAKING USE OF HIGH THROUGHPUT TECHNOLOGIES AS DESCRIBED BY CMS-2020-01-R: HCPCS | Performed by: EMERGENCY MEDICINE

## 2022-05-30 PROCEDURE — 93005 ELECTROCARDIOGRAM TRACING: CPT | Performed by: EMERGENCY MEDICINE

## 2022-05-30 PROCEDURE — 25800000 HC PHARMACY IV SOLUTIONS: Performed by: EMERGENCY MEDICINE

## 2022-05-30 PROCEDURE — 96366 THER/PROPH/DIAG IV INF ADDON: CPT

## 2022-05-30 PROCEDURE — 36415 COLL VENOUS BLD VENIPUNCTURE: CPT | Performed by: PHYSICIAN ASSISTANT

## 2022-05-30 RX ORDER — ACETAMINOPHEN 325 MG/1
650 TABLET ORAL ONCE
Status: COMPLETED | OUTPATIENT
Start: 2022-05-30 | End: 2022-05-30

## 2022-05-30 RX ORDER — PREDNISONE 20 MG/1
40 TABLET ORAL DAILY
Qty: 10 TABLET | Refills: 0 | Status: SHIPPED | OUTPATIENT
Start: 2022-05-30 | End: 2023-08-21

## 2022-05-30 RX ORDER — IPRATROPIUM BROMIDE AND ALBUTEROL SULFATE 2.5; .5 MG/3ML; MG/3ML
3 SOLUTION RESPIRATORY (INHALATION) ONCE
Status: COMPLETED | OUTPATIENT
Start: 2022-05-30 | End: 2022-05-30

## 2022-05-30 RX ORDER — AZITHROMYCIN 250 MG/1
250 TABLET, FILM COATED ORAL DAILY
Qty: 4 TABLET | Refills: 0 | Status: SHIPPED | OUTPATIENT
Start: 2022-05-31 | End: 2023-08-26 | Stop reason: HOSPADM

## 2022-05-30 RX ORDER — ALBUTEROL SULFATE 90 UG/1
2 INHALANT RESPIRATORY (INHALATION) EVERY 6 HOURS PRN
Qty: 1 EACH | Refills: 0 | Status: SHIPPED | OUTPATIENT
Start: 2022-05-30 | End: 2025-01-21

## 2022-05-30 RX ADMIN — PREDNISONE 60 MG: 50 TABLET ORAL at 21:41

## 2022-05-30 RX ADMIN — ACETAMINOPHEN 325MG 650 MG: 325 TABLET ORAL at 21:40

## 2022-05-30 RX ADMIN — IPRATROPIUM BROMIDE AND ALBUTEROL SULFATE 3 ML: 2.5; .5 SOLUTION RESPIRATORY (INHALATION) at 21:45

## 2022-05-30 RX ADMIN — SODIUM CHLORIDE 1000 ML: 9 INJECTION, SOLUTION INTRAVENOUS at 21:24

## 2022-05-30 RX ADMIN — AZITHROMYCIN MONOHYDRATE 500 MG: 500 INJECTION, POWDER, LYOPHILIZED, FOR SOLUTION INTRAVENOUS at 21:42

## 2022-05-30 ASSESSMENT — ENCOUNTER SYMPTOMS
SHORTNESS OF BREATH: 1
PALPITATIONS: 0
DIAPHORESIS: 0
NAUSEA: 0
SPUTUM PRODUCTION: 1
FACIAL SWELLING: 0
NERVOUS/ANXIOUS: 0
DIARRHEA: 0
COUGH: 1
FEVER: 1
FATIGUE: 1
WHEEZING: 1
CHILLS: 1
DIFFICULTY URINATING: 0
CHEST TIGHTNESS: 0
ABDOMINAL PAIN: 0
SWOLLEN GLANDS: 0
LIGHT-HEADEDNESS: 0
SORE THROAT: 0
NUMBNESS: 0
VOMITING: 0

## 2022-05-31 VITALS
WEIGHT: 163 LBS | BODY MASS INDEX: 30 KG/M2 | RESPIRATION RATE: 21 BRPM | HEART RATE: 108 BPM | SYSTOLIC BLOOD PRESSURE: 105 MMHG | OXYGEN SATURATION: 93 % | DIASTOLIC BLOOD PRESSURE: 67 MMHG | HEIGHT: 62 IN | TEMPERATURE: 98.9 F

## 2022-05-31 LAB
ATRIAL RATE: 115
PR INTERVAL: 132
QRS DURATION: 68
QT INTERVAL: 304
QTC CALCULATION(BAZETT): 420
R AXIS: -10
T WAVE AXIS: 152
TROPONIN I SERPL HS-MCNC: 3.2 PG/ML
VENTRICULAR RATE: 115

## 2022-05-31 NOTE — ED PROVIDER NOTES
Emergency Medicine Note  HPI   HISTORY OF PRESENT ILLNESS     This is a 57-year-old female, history of tobacco abuse (smokes a pack per day/last use this afternoon), multiple sclerosis, thoracic aortic ulceration, hypertension, stage III chronic kidney disease and prediabetes.  Patient states she has been dyspneic for the past week.  SOB is  at rest, worse with activity.    Associate  Symptoms include w cough with clear expectoration and wheezing.  Of note, she has never been diagnosed with COPD, however she has  never seen a pulmonologist in the past.  Patient took a COVID swab days ago, read as negative.  She is up-to-date with her vaccinations including boosters.  Patient is also experiencing chills.  Upon arrival, she is a temperature of 100.7      History provided by:  Patient and medical records   used: No    Shortness of Breath  Severity:  Moderate  Timing:  Constant  Progression:  Worsening  Associated symptoms: cough, fever, sputum production and wheezing    Associated symptoms: no abdominal pain, no chest pain, no diaphoresis, no sore throat, no swollen glands and no vomiting    Cough  Associated symptoms: chills, fever, shortness of breath and wheezing    Associated symptoms: no chest pain, no diaphoresis and no sore throat          Patient History   PAST HISTORY     Reviewed from Nursing Triage:         Past Medical History:   Diagnosis Date   • CVA (cerebral vascular accident) (CMS/HCC)    • Hyperlipidemia    • Hypertension    • MS (multiple sclerosis) (CMS/[facility])        Past Surgical History:   Procedure Laterality Date   • HYSTERECTOMY         History reviewed. No pertinent family history.    Social History     Tobacco Use   • Smoking status: Current Every Day Smoker     Packs/day: 1.00     Types: Cigarettes   • Smokeless tobacco: Never Used   Substance Use Topics   • Alcohol use: Yes     Alcohol/week: 3.0 standard drinks     Types: 3 Shots of liquor per week     Comment: drinks  liquor 2 times per week   • Drug use: Not Currently     Types: Marijuana         Review of Systems   REVIEW OF SYSTEMS     Review of Systems   Constitutional: Positive for chills, fatigue and fever. Negative for diaphoresis.   HENT: Positive for congestion. Negative for facial swelling and sore throat.    Respiratory: Positive for cough, sputum production, shortness of breath and wheezing. Negative for chest tightness.    Cardiovascular: Negative for chest pain, palpitations and leg swelling.   Gastrointestinal: Negative for abdominal pain, diarrhea, nausea and vomiting.   Genitourinary: Negative for difficulty urinating.   Neurological: Negative for light-headedness and numbness.   Psychiatric/Behavioral: The patient is not nervous/anxious.          VITALS     ED Vitals    Date/Time Temp Pulse Resp BP SpO2 [facility]   05/30/22 2130 -- 114 26 135/91 95 % EDC   05/30/22 2035 38.2 °C (100.7 °F) 127 19 134/73 95 % AC        Pulse Ox %: 95 % (05/30/22 2103)  Pulse Ox Interpretation: Low (05/30/22 2103)  Heart Rate: 125 (05/30/22 2103)  Rhythm Strip Interpretation: Sinus Tachycardia (05/30/22 2103)     Physical Exam   PHYSICAL EXAM     Physical Exam  Constitutional:       General: She is awake. She is not in acute distress.     Appearance: She is well-developed, well-groomed and overweight. She is not ill-appearing, toxic-appearing or diaphoretic.   HENT:      Head: Normocephalic and atraumatic.      Mouth/Throat:      Mouth: Mucous membranes are moist.   Cardiovascular:      Rate and Rhythm: Regular rhythm. Tachycardia present.      Heart sounds: Normal heart sounds.   Pulmonary:      Effort: No tachypnea, bradypnea, accessory muscle usage or respiratory distress.      Breath sounds: Examination of the left-middle field reveals wheezing. Examination of the left-lower field reveals wheezing. Wheezing present.   Abdominal:      General: Abdomen is protuberant.      Palpations: Abdomen is soft.      Tenderness: There is no  abdominal tenderness.   Neurological:      Mental Status: She is alert, oriented to person, place, and time and easily aroused.      GCS: GCS eye subscore is 4. GCS verbal subscore is 5. GCS motor subscore is 6.           PROCEDURES     Procedures     DATA     Results     Procedure Component Value Units Date/Time    SARS-CoV-2 (COVID-19), PCR Nasopharynx [[id number]]  (Normal) Collected: 05/30/22 2043    Specimen: Nasopharyngeal Swab from Nasopharynx Updated: 05/30/22 2127    Narrative:      The following orders were created for panel order SARS-CoV-2 (COVID-19), PCR Nasopharynx.  Procedure                               Abnormality         Status                     ---------                               -----------         ------                     SARS-CoV-2 (COVID-19), P...[[id number]]  Normal              Final result                 Please view results for these tests on the individual orders.    SARS-CoV-2 (COVID-19), PCR Nasopharynx [[id number]]  (Normal) Collected: 05/30/22 2043    Specimen: Nasopharyngeal Swab from Nasopharynx Updated: 05/30/22 2127     SARS-CoV-2 (COVID-19) Negative    HS Troponin I (with 2 hour reflex) [[id number]]  (Normal) Collected: 05/30/22 2043    Specimen: Blood, Venous Updated: 05/30/22 2123     High Sens Troponin I 3.2 pg/mL     Basic metabolic panel [[id number]]  (Abnormal) Collected: 05/30/22 2043    Specimen: Blood, Venous Updated: 05/30/22 2117     Sodium 141 mEQ/L      Potassium 4.4 mEQ/L      Chloride 104 mEQ/L      CO2 29 mEQ/L      BUN 15 mg/dL      Creatinine 1.3 mg/dL      Glucose 129 mg/dL      Calcium 9.6 mg/dL      eGFR 42.2 mL/min/1.73m*2      Anion Gap 8 mEQ/L     Protime-INR [[id number]]  (Normal) Collected: 05/30/22 2047    Specimen: Blood, Venous Updated: 05/30/22 2106     PT 12.6 sec      INR 1.0     Comment: INR has no defined significance when PT is within Reference Range.       APTT [[id number]]  (Normal) Collected: 05/30/22 2047    Specimen: Blood, Venous  Updated: 05/30/22 2106     PTT 30 sec     CBC and differential [[id number]]  (Abnormal) Collected: 05/30/22 2043    Specimen: Blood, Venous Updated: 05/30/22 2052     WBC 13.48 K/uL      RBC 5.04 M/uL      Hemoglobin 15.0 g/dL      Hematocrit 44.7 %      MCV 88.7 fL      MCH 29.8 pg      MCHC 33.6 g/dL      RDW 14.4 %      Platelets 281 K/uL      MPV 9.9 fL      Differential Type Auto     nRBC 0.0 %      Immature Granulocytes 0.4 %      Neutrophils 69.2 %      Lymphocytes 20.0 %      Monocytes 8.7 %      Eosinophils 1.0 %      Basophils 0.7 %      Immature Granulocytes, Absolute 0.05 K/uL      Neutrophils, Absolute 9.33 K/uL      Lymphocytes, Absolute 2.69 K/uL      Monocytes, Absolute 1.17 K/uL      Eosinophils, Absolute 0.14 K/uL      Basophils, Absolute 0.10 K/uL           Imaging Results          X-RAY CHEST 1 VIEW (Preliminary result)  Result time 05/30/22 21:12:03    ED Interpretation    nad                              EKG 12 lead   ED Interpretation   Sinus tachycardia 115 bpm, low voltage, T wave inversions V1 through V3, no change from previous EKG          Scoring tools                                 ED Course & MDM   MDM / ED COURSE / CLINICAL IMPRESSIONS / DISPO     MDM  Number of Diagnoses or Management Options  Diagnosis management comments: 57-year-old who presents here with symptoms above  Rule out flu, COVID, pneumonia, COPD exacerbation  Labs, x-rays, antipyretics, will give a dose azithromycin secondary to patient's smoking history       Amount and/or Complexity of Data Reviewed  Clinical lab tests: reviewed  Tests in the radiology section of CPT®: ordered        ED Course as of 05/30/22 2314   Mon May 30, 2022   2247 Patient ambulated.  Pulse oximeter noted to be in the mid to high 90s.  Patient without evidence of pneumonia on x-ray.  Will give patient zithromycin , as she is actively smoking.  In addition, patient to start beta agonist as well as steroids  [EK]      ED Course User Index  [EK]  Radha Oliveira, DO         Clinical Impressions as of 05/30/22 2311   Acute bronchitis, unspecified organism   Tobacco abuse     Discharge         Radha Oliveira,   05/30/22 1367

## 2022-05-31 NOTE — ED TRIAGE NOTES
"\"Having a hard time breathing right\", pt states she has been coughing since last week Friday. Denies chest pain. Pt states she took a home COVID test which was negative. Earlier today pt reports her whole body was \"achy\" but the feeling went away.    "

## 2022-05-31 NOTE — DISCHARGE INSTRUCTIONS
Please return to the ER for any chest pain/increased shortness of breath/nausea/vomiting.  Please take your medication as directed.  Your next dose of antibiotics, steroids are tomorrow.  In addition, please use your inhaler every 6 hours while awake.  Stop smoking.  Please follow-up with your primary care doctor this week

## 2023-06-23 ENCOUNTER — APPOINTMENT (RX ONLY)
Dept: URBAN - METROPOLITAN AREA CLINIC 28 | Facility: CLINIC | Age: 58
Setting detail: DERMATOLOGY
End: 2023-06-23

## 2023-06-23 DIAGNOSIS — L82.0 INFLAMED SEBORRHEIC KERATOSIS: ICD-10-CM

## 2023-06-23 PROCEDURE — ? COUNSELING

## 2023-06-23 PROCEDURE — 17110 DESTRUCTION B9 LES UP TO 14: CPT

## 2023-06-23 PROCEDURE — ? BENIGN DESTRUCTION

## 2023-06-23 ASSESSMENT — LOCATION DETAILED DESCRIPTION DERM: LOCATION DETAILED: LEFT SUPERIOR CENTRAL MALAR CHEEK

## 2023-06-23 ASSESSMENT — LOCATION SIMPLE DESCRIPTION DERM: LOCATION SIMPLE: LEFT CHEEK

## 2023-06-23 ASSESSMENT — LOCATION ZONE DERM: LOCATION ZONE: FACE

## 2023-06-23 NOTE — PROCEDURE: BENIGN DESTRUCTION
Medical Necessity Clause: This procedure was medically necessary because the lesions that were treated were:
Medical Necessity Information: It is in your best interest to select a reason for this procedure from the list below. All of these items fulfill various CMS LCD requirements except the new and changing color options.
Render Post-Care Instructions In Note?: no
Detail Level: Detailed
Post-Care Instructions: I reviewed with the patient in detail post-care instructions. Patient is to wear sunprotection, and avoid picking at any of the treated lesions. Pt may apply Vaseline to crusted or scabbing areas.
Treatment Number (Will Not Render If 0): 1
Consent: The patient's consent was obtained including but not limited to risks of crusting, scabbing, blistering, scarring, darker or lighter pigmentary change, recurrence, incomplete removal and infection.

## 2023-08-21 ENCOUNTER — HOSPITAL ENCOUNTER (INPATIENT)
Facility: HOSPITAL | Age: 58
LOS: 5 days | Discharge: HOME | DRG: 871 | End: 2023-08-26
Attending: EMERGENCY MEDICINE | Admitting: STUDENT IN AN ORGANIZED HEALTH CARE EDUCATION/TRAINING PROGRAM
Payer: MEDICARE

## 2023-08-21 ENCOUNTER — APPOINTMENT (EMERGENCY)
Dept: RADIOLOGY | Facility: HOSPITAL | Age: 58
DRG: 871 | End: 2023-08-21
Payer: MEDICARE

## 2023-08-21 DIAGNOSIS — J18.9 COMMUNITY ACQUIRED PNEUMONIA OF RIGHT MIDDLE LOBE OF LUNG: ICD-10-CM

## 2023-08-21 DIAGNOSIS — E87.6 HYPOKALEMIA: Primary | ICD-10-CM

## 2023-08-21 PROBLEM — N17.9 AKI (ACUTE KIDNEY INJURY) (CMS/HCC): Status: ACTIVE | Noted: 2023-08-21

## 2023-08-21 PROBLEM — J15.9 COMMUNITY ACQUIRED BACTERIAL PNEUMONIA: Status: ACTIVE | Noted: 2023-08-21

## 2023-08-21 PROBLEM — F10.10 ALCOHOL ABUSE: Status: ACTIVE | Noted: 2023-08-21

## 2023-08-21 PROBLEM — F32.A DEPRESSION: Status: ACTIVE | Noted: 2023-08-21

## 2023-08-21 PROBLEM — Z72.0 TOBACCO USE: Status: ACTIVE | Noted: 2023-08-21

## 2023-08-21 LAB
ALBUMIN SERPL-MCNC: 3.9 G/DL (ref 3.5–5.7)
ALP SERPL-CCNC: 106 IU/L (ref 34–125)
ALT SERPL-CCNC: 15 IU/L (ref 7–52)
ANION GAP SERPL CALC-SCNC: 10 MEQ/L (ref 3–15)
AST SERPL-CCNC: 24 IU/L (ref 13–39)
BASOPHILS # BLD: 0.07 K/UL (ref 0.01–0.1)
BASOPHILS NFR BLD: 0.3 %
BILIRUB DIRECT SERPL-MCNC: 0.3 MG/DL
BILIRUB SERPL-MCNC: 1 MG/DL (ref 0.3–1.2)
BUN SERPL-MCNC: 15 MG/DL (ref 7–25)
CALCIUM SERPL-MCNC: 9.4 MG/DL (ref 8.6–10.3)
CHLORIDE SERPL-SCNC: 97 MEQ/L (ref 98–107)
CO2 SERPL-SCNC: 28 MEQ/L (ref 21–31)
CREAT SERPL-MCNC: 1.5 MG/DL (ref 0.6–1.2)
DIFFERENTIAL METHOD BLD: ABNORMAL
EOSINOPHIL # BLD: 0.05 K/UL (ref 0.04–0.36)
EOSINOPHIL NFR BLD: 0.2 %
ERYTHROCYTE [DISTWIDTH] IN BLOOD BY AUTOMATED COUNT: 13.7 % (ref 11.7–14.4)
ETHANOL SERPL-MCNC: <10 MG/DL
FLUAV RNA SPEC QL NAA+PROBE: NEGATIVE
FLUBV RNA SPEC QL NAA+PROBE: NEGATIVE
GFR SERPL CREATININE-BSD FRML MDRD: 35.7 ML/MIN/1.73M*2
GLUCOSE SERPL-MCNC: 158 MG/DL (ref 70–99)
HCT VFR BLDCO AUTO: 43.7 % (ref 35–45)
HGB BLD-MCNC: 15.2 G/DL (ref 11.8–15.7)
IMM GRANULOCYTES # BLD AUTO: 0.15 K/UL (ref 0–0.08)
IMM GRANULOCYTES NFR BLD AUTO: 0.6 %
LACTATE SERPL-SCNC: 2 MMOL/L (ref 0.4–2)
LYMPHOCYTES # BLD: 2.33 K/UL (ref 1.2–3.5)
LYMPHOCYTES NFR BLD: 9.6 %
MCH RBC QN AUTO: 30.3 PG (ref 28–33.2)
MCHC RBC AUTO-ENTMCNC: 34.8 G/DL (ref 32.2–35.5)
MCV RBC AUTO: 87.1 FL (ref 83–98)
MONOCYTES # BLD: 2.2 K/UL (ref 0.28–0.8)
MONOCYTES NFR BLD: 9.1 %
NEUTROPHILS # BLD: 19.42 K/UL (ref 1.7–7)
NEUTS SEG NFR BLD: 80.2 %
NRBC BLD-RTO: 0 %
PDW BLD AUTO: 10.6 FL (ref 9.4–12.3)
PLATELET # BLD AUTO: 213 K/UL (ref 150–369)
POTASSIUM SERPL-SCNC: 3.2 MEQ/L (ref 3.5–5.1)
PROT SERPL-MCNC: 8.4 G/DL (ref 6–8.2)
RBC # BLD AUTO: 5.02 M/UL (ref 3.93–5.22)
RSV RNA SPEC QL NAA+PROBE: NEGATIVE
SARS-COV-2 RNA RESP QL NAA+PROBE: NEGATIVE
SODIUM SERPL-SCNC: 135 MEQ/L (ref 136–145)
TROPONIN I SERPL HS-MCNC: 26.7 PG/ML
TROPONIN I SERPL HS-MCNC: 28.5 PG/ML
WBC # BLD AUTO: 24.22 K/UL (ref 3.8–10.5)

## 2023-08-21 PROCEDURE — 25000000 HC PHARMACY GENERAL

## 2023-08-21 PROCEDURE — 80076 HEPATIC FUNCTION PANEL: CPT

## 2023-08-21 PROCEDURE — 99285 EMERGENCY DEPT VISIT HI MDM: CPT | Mod: 25

## 2023-08-21 PROCEDURE — 25800000 HC PHARMACY IV SOLUTIONS: Performed by: PHYSICIAN ASSISTANT

## 2023-08-21 PROCEDURE — G0480 DRUG TEST DEF 1-7 CLASSES: HCPCS

## 2023-08-21 PROCEDURE — 84484 ASSAY OF TROPONIN QUANT: CPT | Performed by: EMERGENCY MEDICINE

## 2023-08-21 PROCEDURE — 81001 URINALYSIS AUTO W/SCOPE: CPT | Performed by: EMERGENCY MEDICINE

## 2023-08-21 PROCEDURE — 93005 ELECTROCARDIOGRAM TRACING: CPT

## 2023-08-21 PROCEDURE — 83605 ASSAY OF LACTIC ACID: CPT | Performed by: PHYSICIAN ASSISTANT

## 2023-08-21 PROCEDURE — 63600000 HC DRUGS/DETAIL CODE: Mod: JZ

## 2023-08-21 PROCEDURE — 85025 COMPLETE CBC W/AUTO DIFF WBC: CPT | Performed by: EMERGENCY MEDICINE

## 2023-08-21 PROCEDURE — 36415 COLL VENOUS BLD VENIPUNCTURE: CPT | Performed by: EMERGENCY MEDICINE

## 2023-08-21 PROCEDURE — 93005 ELECTROCARDIOGRAM TRACING: CPT | Performed by: EMERGENCY MEDICINE

## 2023-08-21 PROCEDURE — 63600000 HC DRUGS/DETAIL CODE: Mod: JZ | Performed by: PHYSICIAN ASSISTANT

## 2023-08-21 PROCEDURE — 21400000 HC ROOM AND CARE CCU/INTERMEDIATE

## 2023-08-21 PROCEDURE — 87637 SARSCOV2&INF A&B&RSV AMP PRB: CPT

## 2023-08-21 PROCEDURE — 99223 1ST HOSP IP/OBS HIGH 75: CPT | Performed by: STUDENT IN AN ORGANIZED HEALTH CARE EDUCATION/TRAINING PROGRAM

## 2023-08-21 PROCEDURE — 87449 NOS EACH ORGANISM AG IA: CPT

## 2023-08-21 PROCEDURE — 71046 X-RAY EXAM CHEST 2 VIEWS: CPT

## 2023-08-21 PROCEDURE — 87040 BLOOD CULTURE FOR BACTERIA: CPT | Performed by: PHYSICIAN ASSISTANT

## 2023-08-21 PROCEDURE — 63700000 HC SELF-ADMINISTRABLE DRUG: Performed by: PHYSICIAN ASSISTANT

## 2023-08-21 PROCEDURE — 25800000 HC PHARMACY IV SOLUTIONS

## 2023-08-21 PROCEDURE — 80048 BASIC METABOLIC PNL TOTAL CA: CPT | Performed by: EMERGENCY MEDICINE

## 2023-08-21 PROCEDURE — 87086 URINE CULTURE/COLONY COUNT: CPT | Performed by: EMERGENCY MEDICINE

## 2023-08-21 RX ORDER — ALBUTEROL SULFATE 0.83 MG/ML
2.5 SOLUTION RESPIRATORY (INHALATION) EVERY 6 HOURS PRN
Status: DISCONTINUED | OUTPATIENT
Start: 2023-08-21 | End: 2023-08-26 | Stop reason: HOSPADM

## 2023-08-21 RX ORDER — FOLIC ACID 1 MG/1
1 TABLET ORAL DAILY
Status: DISCONTINUED | OUTPATIENT
Start: 2023-08-25 | End: 2023-08-26 | Stop reason: HOSPADM

## 2023-08-21 RX ORDER — MIRTAZAPINE 30 MG/1
30 TABLET, FILM COATED ORAL NIGHTLY
COMMUNITY
Start: 2023-07-03

## 2023-08-21 RX ORDER — DOXYCYCLINE HYCLATE 100 MG
100 TABLET ORAL ONCE
Status: DISCONTINUED | OUTPATIENT
Start: 2023-08-21 | End: 2023-08-21

## 2023-08-21 RX ORDER — DULOXETIN HYDROCHLORIDE 60 MG/1
60 CAPSULE, DELAYED RELEASE ORAL NIGHTLY
Status: DISCONTINUED | OUTPATIENT
Start: 2023-08-21 | End: 2023-08-26 | Stop reason: HOSPADM

## 2023-08-21 RX ORDER — METOPROLOL TARTRATE 25 MG/1
25 TABLET, FILM COATED ORAL 2 TIMES DAILY
Status: DISCONTINUED | OUTPATIENT
Start: 2023-08-21 | End: 2023-08-26 | Stop reason: HOSPADM

## 2023-08-21 RX ORDER — SODIUM CHLORIDE 9 MG/ML
250 INJECTION, SOLUTION INTRAVENOUS CONTINUOUS
Status: ACTIVE | OUTPATIENT
Start: 2023-08-21 | End: 2023-08-21

## 2023-08-21 RX ORDER — MIRTAZAPINE 30 MG/1
30 TABLET, FILM COATED ORAL NIGHTLY
Status: DISCONTINUED | OUTPATIENT
Start: 2023-08-21 | End: 2023-08-26 | Stop reason: HOSPADM

## 2023-08-21 RX ORDER — ATORVASTATIN CALCIUM 40 MG/1
40 TABLET, FILM COATED ORAL
Status: DISCONTINUED | OUTPATIENT
Start: 2023-08-22 | End: 2023-08-26 | Stop reason: HOSPADM

## 2023-08-21 RX ORDER — THIAMINE HCL 100 MG
100 TABLET ORAL DAILY
Status: DISCONTINUED | OUTPATIENT
Start: 2023-08-25 | End: 2023-08-26 | Stop reason: HOSPADM

## 2023-08-21 RX ORDER — ACETAMINOPHEN 325 MG/1
975 TABLET ORAL ONCE
Status: COMPLETED | OUTPATIENT
Start: 2023-08-21 | End: 2023-08-21

## 2023-08-21 RX ORDER — CLOPIDOGREL BISULFATE 75 MG/1
75 TABLET ORAL DAILY
Status: DISCONTINUED | OUTPATIENT
Start: 2023-08-22 | End: 2023-08-26 | Stop reason: HOSPADM

## 2023-08-21 RX ORDER — LORAZEPAM 2 MG/ML
2 INJECTION INTRAMUSCULAR EVERY 30 MIN PRN
Status: DISCONTINUED | OUTPATIENT
Start: 2023-08-21 | End: 2023-08-26 | Stop reason: HOSPADM

## 2023-08-21 RX ORDER — OXYCODONE HYDROCHLORIDE 5 MG/1
10 TABLET ORAL EVERY 6 HOURS PRN
Status: DISCONTINUED | OUTPATIENT
Start: 2023-08-21 | End: 2023-08-26 | Stop reason: HOSPADM

## 2023-08-21 RX ORDER — SODIUM CHLORIDE 9 MG/ML
1000 INJECTION, SOLUTION INTRAVENOUS CONTINUOUS
Status: ACTIVE | OUTPATIENT
Start: 2023-08-21 | End: 2023-08-21

## 2023-08-21 RX ORDER — POTASSIUM CHLORIDE 14.9 MG/ML
20 INJECTION INTRAVENOUS ONCE
Status: COMPLETED | OUTPATIENT
Start: 2023-08-22 | End: 2023-08-22

## 2023-08-21 RX ORDER — METOPROLOL TARTRATE 25 MG/1
25 TABLET, FILM COATED ORAL 2 TIMES DAILY
COMMUNITY
Start: 2023-08-02

## 2023-08-21 RX ORDER — PANTOPRAZOLE SODIUM 40 MG/1
40 TABLET, DELAYED RELEASE ORAL DAILY
Status: DISCONTINUED | OUTPATIENT
Start: 2023-08-22 | End: 2023-08-26 | Stop reason: HOSPADM

## 2023-08-21 RX ORDER — DEXTROSE 40 %
15-30 GEL (GRAM) ORAL AS NEEDED
Status: DISCONTINUED | OUTPATIENT
Start: 2023-08-21 | End: 2023-08-26 | Stop reason: HOSPADM

## 2023-08-21 RX ORDER — ENOXAPARIN SODIUM 100 MG/ML
40 INJECTION SUBCUTANEOUS
Status: DISCONTINUED | OUTPATIENT
Start: 2023-08-22 | End: 2023-08-26 | Stop reason: HOSPADM

## 2023-08-21 RX ORDER — POTASSIUM CHLORIDE 14.9 MG/ML
20 INJECTION INTRAVENOUS ONCE
Status: COMPLETED | OUTPATIENT
Start: 2023-08-21 | End: 2023-08-22

## 2023-08-21 RX ORDER — POTASSIUM CHLORIDE 14.9 MG/ML
20 INJECTION INTRAVENOUS ONCE
Status: DISPENSED | OUTPATIENT
Start: 2023-08-21 | End: 2023-08-22

## 2023-08-21 RX ORDER — DEXTROSE 50 % IN WATER (D50W) INTRAVENOUS SYRINGE
25 AS NEEDED
Status: DISCONTINUED | OUTPATIENT
Start: 2023-08-21 | End: 2023-08-26 | Stop reason: HOSPADM

## 2023-08-21 RX ORDER — POTASSIUM CHLORIDE 750 MG/1
40 TABLET, EXTENDED RELEASE ORAL ONCE
Status: DISCONTINUED | OUTPATIENT
Start: 2023-08-21 | End: 2023-08-21

## 2023-08-21 RX ORDER — DULOXETIN HYDROCHLORIDE 60 MG/1
60 CAPSULE, DELAYED RELEASE ORAL NIGHTLY
COMMUNITY
Start: 2023-08-07

## 2023-08-21 RX ORDER — LORAZEPAM 2 MG/ML
0.5 INJECTION INTRAMUSCULAR EVERY 2 HOUR PRN
Status: DISCONTINUED | OUTPATIENT
Start: 2023-08-21 | End: 2023-08-26 | Stop reason: HOSPADM

## 2023-08-21 RX ORDER — NICOTINE 7MG/24HR
1 PATCH, TRANSDERMAL 24 HOURS TRANSDERMAL DAILY
Status: DISCONTINUED | OUTPATIENT
Start: 2023-08-22 | End: 2023-08-26 | Stop reason: HOSPADM

## 2023-08-21 RX ORDER — IBUPROFEN 200 MG
16-32 TABLET ORAL AS NEEDED
Status: DISCONTINUED | OUTPATIENT
Start: 2023-08-21 | End: 2023-08-26 | Stop reason: HOSPADM

## 2023-08-21 RX ORDER — LORAZEPAM 2 MG/ML
1 INJECTION INTRAMUSCULAR
Status: DISCONTINUED | OUTPATIENT
Start: 2023-08-21 | End: 2023-08-26 | Stop reason: HOSPADM

## 2023-08-21 RX ORDER — SODIUM CHLORIDE, SODIUM LACTATE, POTASSIUM CHLORIDE, CALCIUM CHLORIDE 600; 310; 30; 20 MG/100ML; MG/100ML; MG/100ML; MG/100ML
INJECTION, SOLUTION INTRAVENOUS CONTINUOUS
Status: ACTIVE | OUTPATIENT
Start: 2023-08-21 | End: 2023-08-22

## 2023-08-21 RX ADMIN — CEFTRIAXONE SODIUM 1 G: 1 INJECTION, POWDER, FOR SOLUTION INTRAVENOUS at 22:14

## 2023-08-21 RX ADMIN — POTASSIUM CHLORIDE 20 MEQ: 14.9 INJECTION, SOLUTION INTRAVENOUS at 22:20

## 2023-08-21 RX ADMIN — ACETAMINOPHEN 975 MG: 325 TABLET, FILM COATED ORAL at 21:16

## 2023-08-21 RX ADMIN — DOXYCYCLINE 100 MG: 100 INJECTION, POWDER, LYOPHILIZED, FOR SOLUTION INTRAVENOUS at 23:38

## 2023-08-21 RX ADMIN — SODIUM CHLORIDE 250 ML: 9 INJECTION, SOLUTION INTRAVENOUS at 22:24

## 2023-08-21 RX ADMIN — SODIUM CHLORIDE 1000 ML: 9 INJECTION, SOLUTION INTRAVENOUS at 22:14

## 2023-08-21 RX ADMIN — SODIUM CHLORIDE 1000 ML: 9 INJECTION, SOLUTION INTRAVENOUS at 22:06

## 2023-08-21 RX ADMIN — SODIUM CHLORIDE, POTASSIUM CHLORIDE, SODIUM LACTATE AND CALCIUM CHLORIDE: 600; 310; 30; 20 INJECTION, SOLUTION INTRAVENOUS at 23:42

## 2023-08-21 ASSESSMENT — ENCOUNTER SYMPTOMS
NAUSEA: 1
WEAKNESS: 1
FATIGUE: 1
VOMITING: 1
ABDOMINAL PAIN: 0
DIARRHEA: 1
FEVER: 0
COUGH: 0

## 2023-08-21 NOTE — ED PROVIDER NOTES
Emergency Medicine Note  HPI   HISTORY OF PRESENT ILLNESS     Patient is a 58-year-old female with a history of hypertension, hyperlipidemia, MS, CVA with residual left-sided weakness presents to the ED with her daughter with concerns of shortness of breath, nausea, vomiting, vertigo and weakness.  She states about 3 to 4 days ago started with nausea, vomiting, diarrhea.  Unable to eat and drink but was able to tolerate water.  She then started with shortness of breath, vertigo weakness over the past 2 days.  Currently she complains of extreme fatigue.  She does note her friend was diagnosed with COVID who she was around.  She denies cough or congestion.  Denies abdominal pain.  Denies fever or chills at home.      History provided by:  Patient   used: No    Fatigue  Severity:  Moderate  Onset quality:  Gradual  Timing:  Constant  Progression:  Worsening  Chronicity:  New  Associated symptoms: diarrhea, fatigue, nausea and vomiting    Associated symptoms: no abdominal pain, no cough and no fever          Patient History   PAST HISTORY     Reviewed from Nursing Triage:       Past Medical History:   Diagnosis Date   • CVA (cerebral vascular accident) (CMS/HCC)    • Hyperlipidemia    • Hypertension    • MS (multiple sclerosis) (CMS/[facility])        Past Surgical History:   Procedure Laterality Date   • HYSTERECTOMY         No family history on file.    Social History     Tobacco Use   • Smoking status: Every Day     Packs/day: 1.00     Types: Cigarettes   • Smokeless tobacco: Never   Substance Use Topics   • Alcohol use: Yes     Alcohol/week: 3.0 standard drinks of alcohol     Types: 3 Shots of liquor per week     Comment: drinks liquor 2 times per week   • Drug use: Not Currently     Types: Marijuana         Review of Systems   REVIEW OF SYSTEMS     Review of Systems   Constitutional: Positive for fatigue. Negative for fever.   Respiratory: Negative for cough.    Gastrointestinal: Positive for diarrhea,  nausea and vomiting. Negative for abdominal pain.   Neurological: Positive for weakness.         VITALS     ED Vitals    Date/Time Temp Pulse Resp BP SpO2 [facility]   08/21/23 1833 38.4 °C (101.2 °F) -- -- -- --    08/21/23 1820 37.1 °C (98.8 °F) 127 18 139/55 94 % BT                       Physical Exam   PHYSICAL EXAM     Physical Exam  Vitals reviewed.   Constitutional:       Appearance: Normal appearance. She is ill-appearing.   HENT:      Head: Atraumatic.      Mouth/Throat:      Mouth: Mucous membranes are moist.   Eyes:      Conjunctiva/sclera: Conjunctivae normal.   Cardiovascular:      Rate and Rhythm: Regular rhythm. Tachycardia present.   Pulmonary:      Effort: Pulmonary effort is normal.      Breath sounds: Decreased breath sounds present.   Musculoskeletal:      Cervical back: Neck supple.      Right lower leg: No edema.      Left lower leg: No edema.   Skin:     General: Skin is warm.      Capillary Refill: Capillary refill takes less than 2 seconds.   Neurological:      General: No focal deficit present.      Mental Status: She is alert.   Psychiatric:         Mood and Affect: Mood normal.           PROCEDURES     Procedures     DATA     Results     None          Imaging Results    None         ECG 12 lead          Scoring tools                                  ED Course & MDM   MDM / ED COURSE / CLINICAL IMPRESSION / DISPO     Medical Decision Making  Amount and/or Complexity of Data Reviewed  Labs: ordered. Decision-making details documented in ED Course.  Radiology: ordered and independent interpretation performed. Decision-making details documented in ED Course.  ECG/medicine tests: ordered.  Discussion of management or test interpretation with external provider(s): Discussed with medicine for admission     Risk  OTC drugs.  Prescription drug management.  Decision regarding hospitalization.          ED Course as of 08/21/23 2135   Mon Aug 21, 2023   2059 WBC(!): 24.22 [KR]   2120 Lactate: 2.0 [KR]    2127 Creatinine(!): 1.5  1.3 one year ago [KR]   2134 Lab work and imaging results reviewed.  Leukocytosis noted.  Normal lactate.  Chest x-ray consistent with right-sided pneumonia.  Patient is febrile and tachycardic.  Meets sepsis criteria.  Blood cultures ordered and antibiotics.  Will give sepsis fluids.  Case discussed with attending who agrees with IV Rocephin and oral doxycycline d/t patient's QT on EKG [KR]      ED Course User Index  [KR] Norah Rod PA C     Clinical Impression      None               Norah Rod PA C  08/22/23 0058

## 2023-08-22 PROBLEM — E83.42 HYPOMAGNESEMIA: Status: ACTIVE | Noted: 2023-08-22

## 2023-08-22 LAB
ALBUMIN SERPL-MCNC: 2.9 G/DL (ref 3.5–5.7)
ALP SERPL-CCNC: 77 IU/L (ref 34–125)
ALT SERPL-CCNC: 11 IU/L (ref 7–52)
ANION GAP SERPL CALC-SCNC: 10 MEQ/L (ref 3–15)
ANION GAP SERPL CALC-SCNC: 8 MEQ/L (ref 3–15)
AST SERPL-CCNC: 19 IU/L (ref 13–39)
ATRIAL RATE: 131
ATRIAL RATE: 135
BACTERIA URNS QL MICRO: 2 /HPF
BILIRUB SERPL-MCNC: 0.6 MG/DL (ref 0.3–1.2)
BILIRUB UR QL STRIP.AUTO: NEGATIVE MG/DL
BUN SERPL-MCNC: 13 MG/DL (ref 7–25)
BUN SERPL-MCNC: 14 MG/DL (ref 7–25)
CALCIUM SERPL-MCNC: 7.3 MG/DL (ref 8.6–10.3)
CALCIUM SERPL-MCNC: 7.5 MG/DL (ref 8.6–10.3)
CHLORIDE SERPL-SCNC: 106 MEQ/L (ref 98–107)
CHLORIDE SERPL-SCNC: 107 MEQ/L (ref 98–107)
CLARITY UR REFRACT.AUTO: ABNORMAL
CO2 SERPL-SCNC: 22 MEQ/L (ref 21–31)
CO2 SERPL-SCNC: 22 MEQ/L (ref 21–31)
COLOR UR AUTO: ABNORMAL
CREAT SERPL-MCNC: 1.1 MG/DL (ref 0.6–1.2)
CREAT SERPL-MCNC: 1.2 MG/DL (ref 0.6–1.2)
ERYTHROCYTE [DISTWIDTH] IN BLOOD BY AUTOMATED COUNT: 13.9 % (ref 11.7–14.4)
GFR SERPL CREATININE-BSD FRML MDRD: 46.1 ML/MIN/1.73M*2
GFR SERPL CREATININE-BSD FRML MDRD: 51 ML/MIN/1.73M*2
GLUCOSE BLD-MCNC: 120 MG/DL (ref 70–99)
GLUCOSE SERPL-MCNC: 125 MG/DL (ref 70–99)
GLUCOSE SERPL-MCNC: 136 MG/DL (ref 70–99)
GLUCOSE UR STRIP.AUTO-MCNC: NEGATIVE MG/DL
HCT VFR BLDCO AUTO: 35.3 % (ref 35–45)
HGB BLD-MCNC: 12.8 G/DL (ref 11.8–15.7)
HGB UR QL STRIP.AUTO: 2
HYALINE CASTS #/AREA URNS LPF: ABNORMAL /LPF
KETONES UR STRIP.AUTO-MCNC: NEGATIVE MG/DL
L PNEUMO1 AG UR QL: POSITIVE
LEUKOCYTE ESTERASE UR QL STRIP.AUTO: 3
MAGNESIUM SERPL-MCNC: 0.8 MG/DL (ref 1.8–2.5)
MAGNESIUM SERPL-MCNC: 3.5 MG/DL (ref 1.8–2.5)
MCH RBC QN AUTO: 30.5 PG (ref 28–33.2)
MCHC RBC AUTO-ENTMCNC: 36.3 G/DL (ref 32.2–35.5)
MCV RBC AUTO: 84.2 FL (ref 83–98)
MRSA DNA SPEC QL NAA+PROBE: NEGATIVE
NITRITE UR QL STRIP.AUTO: NEGATIVE
P AXIS: 31
P AXIS: 41
PDW BLD AUTO: 11.2 FL (ref 9.4–12.3)
PH UR STRIP.AUTO: 6 [PH]
PLATELET # BLD AUTO: 176 K/UL (ref 150–369)
POCT TEST: ABNORMAL
POTASSIUM SERPL-SCNC: 3 MEQ/L (ref 3.5–5.1)
POTASSIUM SERPL-SCNC: 3.5 MEQ/L (ref 3.5–5.1)
PR INTERVAL: 104
PR INTERVAL: 128
PROT SERPL-MCNC: 5.9 G/DL (ref 6–8.2)
PROT UR QL STRIP.AUTO: 3
QRS DURATION: 72
QRS DURATION: 80
QT INTERVAL: 382
QT INTERVAL: 388
QTC CALCULATION(BAZETT): 564
QTC CALCULATION(BAZETT): 582
R AXIS: -1
R AXIS: 10
RBC # BLD AUTO: 4.19 M/UL (ref 3.93–5.22)
RBC #/AREA URNS HPF: ABNORMAL /HPF
SODIUM SERPL-SCNC: 137 MEQ/L (ref 136–145)
SODIUM SERPL-SCNC: 138 MEQ/L (ref 136–145)
SP GR UR REFRACT.AUTO: 1.02
SQUAMOUS URNS QL MICRO: ABNORMAL /HPF
T WAVE AXIS: 72
T WAVE AXIS: 75
UROBILINOGEN UR STRIP-ACNC: 0.2 EU/DL
VENTRICULAR RATE: 131
VENTRICULAR RATE: 135
WBC # BLD AUTO: 20.96 K/UL (ref 3.8–10.5)
WBC #/AREA URNS HPF: ABNORMAL /HPF

## 2023-08-22 PROCEDURE — 21400000 HC ROOM AND CARE CCU/INTERMEDIATE

## 2023-08-22 PROCEDURE — 99233 SBSQ HOSP IP/OBS HIGH 50: CPT | Performed by: HOSPITALIST

## 2023-08-22 PROCEDURE — 93005 ELECTROCARDIOGRAM TRACING: CPT

## 2023-08-22 PROCEDURE — 25000000 HC PHARMACY GENERAL

## 2023-08-22 PROCEDURE — 63700000 HC SELF-ADMINISTRABLE DRUG: Performed by: STUDENT IN AN ORGANIZED HEALTH CARE EDUCATION/TRAINING PROGRAM

## 2023-08-22 PROCEDURE — 63600000 HC DRUGS/DETAIL CODE: Mod: JZ

## 2023-08-22 PROCEDURE — 83735 ASSAY OF MAGNESIUM: CPT

## 2023-08-22 PROCEDURE — 63700000 HC SELF-ADMINISTRABLE DRUG

## 2023-08-22 PROCEDURE — 87641 MR-STAPH DNA AMP PROBE: CPT

## 2023-08-22 PROCEDURE — 36415 COLL VENOUS BLD VENIPUNCTURE: CPT

## 2023-08-22 PROCEDURE — 80048 BASIC METABOLIC PNL TOTAL CA: CPT

## 2023-08-22 PROCEDURE — 25800000 HC PHARMACY IV SOLUTIONS

## 2023-08-22 PROCEDURE — 63600000 HC DRUGS/DETAIL CODE

## 2023-08-22 PROCEDURE — 80053 COMPREHEN METABOLIC PANEL: CPT

## 2023-08-22 PROCEDURE — 92610 EVALUATE SWALLOWING FUNCTION: CPT | Mod: GN

## 2023-08-22 PROCEDURE — 85027 COMPLETE CBC AUTOMATED: CPT

## 2023-08-22 PROCEDURE — 93010 ELECTROCARDIOGRAM REPORT: CPT | Mod: 76 | Performed by: INTERNAL MEDICINE

## 2023-08-22 PROCEDURE — 93010 ELECTROCARDIOGRAM REPORT: CPT | Performed by: INTERNAL MEDICINE

## 2023-08-22 RX ORDER — ACETAMINOPHEN 325 MG/1
650 TABLET ORAL EVERY 6 HOURS PRN
Status: DISCONTINUED | OUTPATIENT
Start: 2023-08-22 | End: 2023-08-26 | Stop reason: HOSPADM

## 2023-08-22 RX ORDER — POTASSIUM CHLORIDE 14.9 MG/ML
20 INJECTION INTRAVENOUS ONCE
Status: COMPLETED | OUTPATIENT
Start: 2023-08-22 | End: 2023-08-23

## 2023-08-22 RX ORDER — POTASSIUM CHLORIDE 14.9 MG/ML
20 INJECTION INTRAVENOUS ONCE
Status: COMPLETED | OUTPATIENT
Start: 2023-08-22 | End: 2023-08-22

## 2023-08-22 RX ORDER — METRONIDAZOLE 500 MG/100ML
500 INJECTION, SOLUTION INTRAVENOUS
Status: DISCONTINUED | OUTPATIENT
Start: 2023-08-22 | End: 2023-08-22

## 2023-08-22 RX ORDER — POTASSIUM CHLORIDE 14.9 MG/ML
20 INJECTION INTRAVENOUS AS NEEDED
Status: DISCONTINUED | OUTPATIENT
Start: 2023-08-22 | End: 2023-08-22

## 2023-08-22 RX ORDER — POTASSIUM CHLORIDE 14.9 MG/ML
20 INJECTION INTRAVENOUS ONCE
Status: DISCONTINUED | OUTPATIENT
Start: 2023-08-22 | End: 2023-08-22

## 2023-08-22 RX ORDER — ACETAMINOPHEN 325 MG/1
650 TABLET ORAL EVERY 6 HOURS PRN
Status: DISCONTINUED | OUTPATIENT
Start: 2023-08-22 | End: 2023-08-22

## 2023-08-22 RX ORDER — PANTOPRAZOLE SODIUM 40 MG/1
40 TABLET, DELAYED RELEASE ORAL DAILY
Status: CANCELLED | OUTPATIENT
Start: 2023-08-23

## 2023-08-22 RX ORDER — POTASSIUM CHLORIDE 750 MG/1
40 TABLET, EXTENDED RELEASE ORAL ONCE
Status: COMPLETED | OUTPATIENT
Start: 2023-08-22 | End: 2023-08-22

## 2023-08-22 RX ORDER — LOPERAMIDE HYDROCHLORIDE 2 MG/1
2 CAPSULE ORAL 2 TIMES DAILY PRN
Status: DISCONTINUED | OUTPATIENT
Start: 2023-08-22 | End: 2023-08-26 | Stop reason: HOSPADM

## 2023-08-22 RX ORDER — ACETAMINOPHEN 650 MG/1
650 SUPPOSITORY RECTAL ONCE
Status: COMPLETED | OUTPATIENT
Start: 2023-08-22 | End: 2023-08-22

## 2023-08-22 RX ADMIN — ACETAMINOPHEN 650 MG: 325 TABLET, FILM COATED ORAL at 14:11

## 2023-08-22 RX ADMIN — ACETAMINOPHEN 650 MG: 650 SUPPOSITORY RECTAL at 07:47

## 2023-08-22 RX ADMIN — MAGNESIUM SULFATE HEPTAHYDRATE 2 G: 40 INJECTION, SOLUTION INTRAVENOUS at 13:58

## 2023-08-22 RX ADMIN — MAGNESIUM SULFATE HEPTAHYDRATE 2 G: 40 INJECTION, SOLUTION INTRAVENOUS at 09:29

## 2023-08-22 RX ADMIN — CLOPIDOGREL BISULFATE 75 MG: 75 TABLET ORAL at 10:10

## 2023-08-22 RX ADMIN — POTASSIUM CHLORIDE 20 MEQ: 14.9 INJECTION, SOLUTION INTRAVENOUS at 23:44

## 2023-08-22 RX ADMIN — FOLIC ACID 1 MG: 5 INJECTION, SOLUTION INTRAMUSCULAR; INTRAVENOUS; SUBCUTANEOUS at 09:08

## 2023-08-22 RX ADMIN — NICOTINE 1 PATCH: 7 PATCH, EXTENDED RELEASE TRANSDERMAL at 09:08

## 2023-08-22 RX ADMIN — SODIUM CHLORIDE, POTASSIUM CHLORIDE, SODIUM LACTATE AND CALCIUM CHLORIDE: 600; 310; 30; 20 INJECTION, SOLUTION INTRAVENOUS at 17:03

## 2023-08-22 RX ADMIN — ATORVASTATIN CALCIUM 40 MG: 40 TABLET, FILM COATED ORAL at 18:05

## 2023-08-22 RX ADMIN — METOPROLOL TARTRATE 25 MG: 25 TABLET, FILM COATED ORAL at 10:10

## 2023-08-22 RX ADMIN — METRONIDAZOLE 500 MG: 5 INJECTION, SOLUTION INTRAVENOUS at 11:37

## 2023-08-22 RX ADMIN — METOPROLOL TARTRATE 25 MG: 25 TABLET, FILM COATED ORAL at 19:53

## 2023-08-22 RX ADMIN — MAGNESIUM SULFATE HEPTAHYDRATE 2 G: 40 INJECTION, SOLUTION INTRAVENOUS at 07:25

## 2023-08-22 RX ADMIN — POTASSIUM CHLORIDE 20 MEQ: 14.9 INJECTION, SOLUTION INTRAVENOUS at 17:03

## 2023-08-22 RX ADMIN — POTASSIUM CHLORIDE 20 MEQ: 14.9 INJECTION, SOLUTION INTRAVENOUS at 19:45

## 2023-08-22 RX ADMIN — POTASSIUM CHLORIDE 40 MEQ: 750 TABLET, EXTENDED RELEASE ORAL at 13:20

## 2023-08-22 RX ADMIN — METRONIDAZOLE 500 MG: 5 INJECTION, SOLUTION INTRAVENOUS at 18:05

## 2023-08-22 RX ADMIN — LOPERAMIDE HYDROCHLORIDE 2 MG: 2 CAPSULE ORAL at 13:18

## 2023-08-22 RX ADMIN — MAGNESIUM SULFATE HEPTAHYDRATE 2 G: 40 INJECTION, SOLUTION INTRAVENOUS at 11:41

## 2023-08-22 RX ADMIN — AZITHROMYCIN MONOHYDRATE 500 MG: 500 INJECTION, POWDER, LYOPHILIZED, FOR SOLUTION INTRAVENOUS at 19:55

## 2023-08-22 RX ADMIN — METRONIDAZOLE 500 MG: 5 INJECTION, SOLUTION INTRAVENOUS at 02:33

## 2023-08-22 RX ADMIN — DOXYCYCLINE 100 MG: 100 INJECTION, POWDER, LYOPHILIZED, FOR SOLUTION INTRAVENOUS at 10:08

## 2023-08-22 RX ADMIN — DULOXETINE HYDROCHLORIDE 60 MG: 60 CAPSULE, DELAYED RELEASE ORAL at 21:33

## 2023-08-22 RX ADMIN — POTASSIUM CHLORIDE 20 MEQ: 14.9 INJECTION, SOLUTION INTRAVENOUS at 00:51

## 2023-08-22 RX ADMIN — LORAZEPAM 0.5 MG: 2 INJECTION INTRAMUSCULAR; INTRAVENOUS at 21:33

## 2023-08-22 RX ADMIN — MIRTAZAPINE 30 MG: 30 TABLET, FILM COATED ORAL at 21:32

## 2023-08-22 RX ADMIN — THIAMINE HYDROCHLORIDE 200 MG: 100 INJECTION, SOLUTION INTRAMUSCULAR; INTRAVENOUS at 09:59

## 2023-08-22 RX ADMIN — LORAZEPAM 0.5 MG: 2 INJECTION INTRAMUSCULAR; INTRAVENOUS at 03:40

## 2023-08-22 RX ADMIN — PANTOPRAZOLE SODIUM 40 MG: 40 TABLET, DELAYED RELEASE ORAL at 10:10

## 2023-08-22 RX ADMIN — ENOXAPARIN SODIUM 40 MG: 40 INJECTION SUBCUTANEOUS at 18:05

## 2023-08-22 ASSESSMENT — COGNITIVE AND FUNCTIONAL STATUS - GENERAL
UNDERSTANDING 10 TO 15 MIN SPEECH: 4 - NONE
REMEMBERING TO TAKE MEDICATION: 3 - A LITTLE
WALKING IN HOSPITAL ROOM: 2 - A LOT
FOLLOWS FAMILIAR CONVERSATION: 4 - NONE
CLIMB 3 TO 5 STEPS WITH RAILING: 2 - A LOT
TAKING CARE OF COMPLICATED TASKS: 3 - A LITTLE
AFFECT: FLAT/BLUNTED AFFECT
MOVING TO AND FROM BED TO CHAIR: 2 - A LOT
REMEMBERING WHERE THINGS ARE: 4 - NONE
REMEMBERING 5 ERRANDS WITH NO LIST: 3 - A LITTLE
STANDING UP FROM CHAIR USING ARMS: 2 - A LOT

## 2023-08-22 NOTE — PLAN OF CARE
Plan of Care Review  Plan of Care Reviewed With: patient  Progress: no change  Outcome Evaluation: Pt febrile off/on with tylenol given + ice packs. No s/s aspiration seen with meds/food. 2 liquid stools which pt reports is normal for her.

## 2023-08-22 NOTE — CONSULTS
ID Consult Dictated #  [id number]    IMPRESSION:  Legionella pneumonia  Prolonged QTc  Thoracic aortic anuerysm    RECOMMENDATION:  1. D/c CTX, flagyl and doxycycline  2. Initiate Azithromycin 500 mg IV q 24 hours.  Risks and benefits of ABx discussed with patient  3. Discussed with primary team    Bill Faith MD

## 2023-08-22 NOTE — HOSPITAL COURSE
"Shayla is a 58 y.o. female admitted on 8/21/2023 with Community acquired bacterial pneumonia [J15.9]. Principal problem is Community acquired bacterial pneumonia.    Past Medical History  Shayla has a past medical history of CVA (cerebral vascular accident) (CMS/[facility]), Hyperlipidemia, Hypertension, and MS (multiple sclerosis) (CMS/[facility]).    History of Present Illness   adm to [facility] 8/21 w/ c/o generalized weakness. +sepsis d/t pna, c/f CAP vs. possible aspiration pna. Chest imaging- \"new airspace opacity laterally w/in RML c/f pna, remainder lung clear\". CIWA.  "

## 2023-08-22 NOTE — PLAN OF CARE
Problem: Adult Inpatient Plan of Care  Goal: Plan of Care Review  Outcome: Progressing  Flowsheets (Taken 8/22/2023 1154)  Progress: improving  Outcome Evaluation: ST eval completed. Rec reg/thins, meds via pt preference. Aspiration precautions. Consider GI f/u d/t reported symptoms. See eval. ST to f/u briefly.  Plan of Care Reviewed With: patient

## 2023-08-22 NOTE — PLAN OF CARE
Problem: Adult Inpatient Plan of Care  Goal: Patient-Specific Goal (Individualized)  Outcome: Progressing  Flowsheets (Taken 8/22/2023 0102)  Patient/Family-Specific Goals (Include Timeframe): D/C to home whem stable.  Individualized Care Needs: assist with ADL's, sepsis protocol, FSP, NPO maintained.  Anxieties, Fears or Concerns: wants to eat.   Plan of Care Review  Plan of Care Reviewed With: patient  Progress: improving  Outcome Evaluation: IVF, sepsis protocol followed, IVAB, CIWA protocol.

## 2023-08-22 NOTE — ED ATTESTATION NOTE
I saw and evaluated the patient.  I provided a substantive portion of the care for this patient. I personally performed all aspects of the medical decision making for this encounter.  I have reviewed and verified this documentation and it accurately reflects our care.     The patient presented with shortness of breath and weakness, as well as nausea and vomiting.  She also feels dizzy.  She reported a cough on review of systems as well.  She denies any chest pain or lower extremity swelling.  Here, she was febrile and had a significantly elevated white count with a lactate of 2.0.  Chest x-ray revealed a right middle lobe pneumonia.  We sent blood cultures and started IV antibiotics.  She was admitted to the hospitalist service.    Matt (Ed) MD Rubio Palma Henry (Ed) MD Stone  08/22/23 1031

## 2023-08-22 NOTE — ASSESSMENT & PLAN NOTE
Mg came back 0.8 (8/22/2023)  Mg continues to fluctuate  - Replete Mg as needed  - Considering EKG  - holding protonix  -Increased imodium to reduce diarrhea

## 2023-08-22 NOTE — NURSING NOTE
Patient assisted to BR, vomited while in BR. Assisted back to bed, CIWA score 10, Ativan 0.5mg iv given.

## 2023-08-22 NOTE — ASSESSMENT & PLAN NOTE
Cr 1.5 on presentation from b/l of ~1.0  Likely pre-renal 2/2 GI losses, sepsis PNA  Examines dry  Improving    - Trend Cr on daily labs; avoid nephrotoxins, dose meds renally  - LR

## 2023-08-22 NOTE — ASSESSMENT & PLAN NOTE
2-4 [place] hard drinks/day  Last drink was yesterday, c/f withdrawal    - CIWA protocol, Thiamine, folate, MVI  - Continue aspiration and seizure precautions  - SLP- rec regular and thins; educated on aspiration precautions  - A consult- patient not interested

## 2023-08-22 NOTE — ASSESSMENT & PLAN NOTE
K 3.2 on admission  Likely 2/2 GI losses with n/v    - Mg replete and K replete  - Consider EKG   - Replete to K>4  - Increased imodium to reduce diarrhea

## 2023-08-22 NOTE — ASSESSMENT & PLAN NOTE
P/w SOB, cough productive of clear/yellow sputum x4 days, does have sick contacts (friend with COVID)  CXR shows RLL infiltrate  WBC 24.2  COVID/RSV/flu swab negative  No risk factors for pseudomonas, no recent antibiotic course  Currently not hypoxic, meets 2/4 SIRS criteria with fever, WBC  Hemodynamically stable, lactate 2.0  Doxy Stopped (QTC prolonged)  Ddx: CAP vs aspiration PNA given alcohol drinking history  MRSA nares- neg  Legionella Positive  For SOB/CP on 8/24: got CXR PA and lateral- shows pleural effusions c/f volume overload      - diuressed with Lasix 20 mg- improved    - CTX, doxy, and flagyl D/C'd   - Started on Azithromycin on 8/22, transitioned to PO- currently day 4  - continue to monitor Electrolytes.   - Increase Imodium to reduce losses via diarrhea  - scx, bcx, stool culture- negative  - Supportive care with MDI albuterol, acetaminophen, mucinex

## 2023-08-22 NOTE — PROGRESS NOTES
"Patient:  Shayla Lawrence  Location:  [place] [address]  MRN:  [id number]  Today's date:  8/22/2023    SLP Diagnosis  Functional oral stage in s/o upper/lower dentures. No overt s/sx aspiration. Likely esophageal dysphagia w/ hx GERD, globus sensation.    Swallowing Recommendations  Diet Consistency thin liquids, regular     Medication Administration  (pt preference)   Supervision Level 1:1 supervision needed   Feeding Recommendations allow patient to feed self if maintaining safety, allow extra time for meals, eliminate all distractions, small bites/sips, stop meal if showing signs of aspiration or fatigue (e.g., coughing, wet voice)   Posture Recommendations  fully upright w/ po and 30 degrees at all times   Swallowing Strategies alternate food and liquid intake; cyclic ingestion   Instrumental Assessment Recommendations reassess via non-instrumental clinical swallow evaluation (ST to f/u briefly bedside in s/o remarkable chest imaging).     Comments oral hygiene, aspiration precautions; GERD precautions     Summary/Handoff  ST sahra completed. Pt is a 57 y/o F, adm to [facility] 8/21 w/ c/o generalized weakness. +sepsis d/t pna, c/f CAP vs. possible aspiration pna. Consumes reg/thins at baseline. Currently on CIWA precautions. Consumes reg/thins baseline. Predisposing dysphagia risk factors-hx CVA ~5 yrs ago. ST c/s d/t pt w/ \"coughing while eating\" but w/ detailed questioning with patient, symptoms appear largely esophageal related as pt endorsing \"no difficulty swallow\" but endorses globus sensation and regurgitation of liquids. Noted pt w/ dx GERD on PPI. Pt endorses seeing GI w/ hx scopes, unable to locate in pt's chart. Reviewed GERD precautions. Pt seen w/ therapeutic po trials of reg/thins at the bedside. No clinical signs of aspiration w/ extended po trials. ? pharyngeal symptoms including audible swallows, hyperactive gag w/ lingual protrusion. No other findings w/ OME. Currently denying " "globus sensation with use of cyclic ingestion. Functional mastication/breakdown of regular solids w/ upper/lower dentures given incr'd time. No significant oral residuals. Noted chest imaging is c/f pna \"new airspace opacity laterally w/in RML c/f pna, remainder lung clear\", therefore ST to f/u briefly for diet tolerance. At this time, resume baseline reg/thins, aspiration precautions, oral hygiene.    Active Diet Orders (From admission, onward)     Start     Ordered    23 1002  Adult Diet Regular; Thin Liquids; RD/LDN may adjust order  Diet effective now        Question Answer Comment   Diet Texture Regular    Fluid Consistency: Thin Liquids    Delegation of Authority. Diet orders written by PA/CRBecca may not be adjusted by RD/LDNs. RD/LDN may adjust order        23 1001                Shayla is a 58 y.o. female admitted on 2023 with Community acquired bacterial pneumonia [J15.9]. Principal problem is Community acquired bacterial pneumonia.    Past Medical History  Shayla has a past medical history of CVA (cerebral vascular accident) (CMS/[facility]), Hyperlipidemia, Hypertension, and MS (multiple sclerosis) (CMS/[facility]).    History of Present Illness   adm to [facility]  w/ c/o generalized weakness. +sepsis d/t pna, c/f CAP vs. possible aspiration pna. Chest imaging- \"new airspace opacity laterally w/in RML c/f pna, remainder lung clear\". CIWA.    Imagin/21 cxr  FINDINGS:     There is new airspace opacity laterally within the right middle lobe concerning  for pneumonia. The remainder of the lung is clear. There is no pleural effusion  or pneumothorax. The cardia mediastinal silhouette is normal. The upper abdomen  is unremarkable. There is no acute osseous abnormality.        SLP Pain    Date/Time Pain Type Rating: Rest Rating: Activity Who   23 0938 Pain Assessment 0 - no pain 0 - no pain MGT          Prior Living Environment    Flowsheet Row Most Recent Value   People in Home child(marley), adult " "  Current Living Arrangements home   Home Accessibility stairs to enter home (Group)        Prior Level of Function    Flowsheet Row Most Recent Value   Communication understands/communicates without difficulty   Swallowing difficulty swallowing liquids   Baseline Diet/Method of Nutritional Intake no diet restrictions, thin liquids, regular   Past History of Dysphagia predisposing dysphagia risk factors-hx CVA ~5 yrs ago. ST c/s d/t pt w/ \"coughing while eating\" but w/ detailed questioning with patient, symptoms appear largely esophageal related as pt endorsing \"no difficulty swallow\" but endorses globus sensation and regurgitation of liquids.   Assistive Device Currently Used at Home walker, front-wheeled, cane, straight, shower chair, grab bar, commode, 3-in-1           SLP Evaluation and Treatment - 08/22/23 0938        SLP Time Calculation    Start Time 0938     Stop Time 0956     Time Calculation (min) 18 min        General Information    Document Type Initial Evaluation     Mode of Treatment individual therapy;speech language pathology     Position at Start of Session reclined;in bed     Status at Start of Session agreeable to therapy;no issues or concerns identified by nurse prior to session     General Observations of Patient alert/awake, requesting water        Precautions/Limitations/Impairments    Existing Precautions/Restrictions aspiration;fall;seizures   CIWA    Limitations/Impairments swallowing        Cognition/Psychosocial    Affect/Mental Status flat/blunted affect     Orientation Status oriented to;person;place;time;situation   self corrected year, +president, +age, + bday    Comment, Cognition able to follow all commands for eval, able to contribute to PMHx, answered phone and able to detail home s/u and adaptive equipment questions.        Dentition (Oral Motor)    Dentition (Oral Motor) dental appliance/dentures     Dental Appliance/Denture (Oral Motor) dentures, full;upper and lower        " "Facial Symmetry (Oral Motor)    Facial Symmetry (Oral Motor) WNL     Comment, Facial Symmetry (Oral Motor) noted L sided residual deficits noted on arm impacting self feeding        Lip Function (Oral Motor)    Comment, Lip Function (Oral Motor) WFL        Tongue Function (Oral Motor)    Tongue ROM (Oral Motor) protrusion is impaired     Protrusion, Tongue ROM Impairment (Oral Motor) bilateral;minimal impairment   hyperactive gag w/ protrusion    Tongue Strength (Oral Motor) WFL     Tongue Coordination/Speed (Oral Motor) reduced rate     Comment, Tongue Function (Oral Motor) mildly reduced lingual lateralization ROM to L vs R; hx residual deficits s/p CVA        Jaw Function (Oral Motor)    Comment, Jaw Function (Oral Motor) WFL        Cough/Swallow/Gag Reflex (Oral Motor)    Soft Palate/Velum (Oral Motor) WNL     Gag Reflex (Oral Motor) hyperactive     Volitional Throat Clear/Cough (Oral Motor) WNL     Volitional Swallow (Oral Motor) WNL        Vocal Quality/Secretion Management (Oral Motor)    Vocal Quality (Oral Motor) WFL     Secretion Management (Oral Motor) WNL        GRBAS    Grade 0-->none     Roughness 0-->none     Breathiness 0-->none     Asthenia 0-->none     Strain 0-->none        Motor Speech    Comment, Motor Speech Assessment 100% intelligible in conversational speech        Auditory Comprehension    Comment, Assessment (Auditory Comprehension) answers complex y/n questions pertaining to PMHx, following all commands for OME/po trials/eval        Verbal Expression    Comment, Assessment (Verbal Expression) able to participate in complex convo pertaining to PMhx        Pragmatic Language    Nonverbal Skills (Pragmatic Language) WFL        Functional Communication Measures    FCM: Swallowing 6-->Level 6        General Swallowing Observations    Current Diet/Method of Nutritional Intake NPO     Signs/Symptoms of Aspiration (Current Diet) --   ,chest imaging \"new airspace opacity laterally w/in RML c/f " "pna\"    Respiratory Support (General Swallowing Observations) none     Comment, Secretions/Suctioning WFL     Comment, General Swallowing Observations pt endorsing \"no difficulty swallow\" but endorses globus sensation and regurgitation of liquids. Noted pt w/ dx GERD on PPI. Pt endorses seeing GI w/ hx scopes, unable to locate in pt's chart.        Food and Liquid Trials (NIS)    Patient Positioning HOB elevated (specify degrees)     Oral Intake/Feeding Performance independent/appropriate self-feeding skills   w/ R hand    Liquid Consistencies Evaluated thin liquids     Thin Liquids intact     Comment, Thin Liquids no s/sx aspiration w/ single and consec sips cup/straw; audible swallow     Food Consistencies Evaluated regular     Regular intact;patient controlled amounts     Comment, Regular slow but functional mastication/breakdown w/ upper/lower dentures, no s/sx aspiration     Oral Preparatory Phase of Swallow mastication, slow but effective;mild impairment     Oral Phase of Swallow delayed anterior-posterior transit     Comment, Oral Phase mildly prolonged yet effective mastication, mildly prolonged lingual transfers, overall functional w/ dentures in place, no significant oral residue post swallow, pt ind using liquid wash after each bite     Pharyngeal Phase of Swallow no clinical symptoms   audible swallows    Esophageal Phase of Swallow no clinical symptoms   denies symptoms currently w/ trials, ind use of cyclic ingestion, reviewed GERD precautions    SpO2 Level stable on room air        Swallowing Recommendations    Diet Consistency Recommendations thin liquids;regular     Medication Administration --   pt preference    Supervision Level for Intake 1:1 supervision needed     Feeding/Delivery Recommendations allow patient to feed self if maintaining safety;allow extra time for meals;eliminate all distractions;small bites/sips;stop meal if showing signs of aspiration or fatigue (e.g., coughing, wet voice)  "    Swallowing Strategies alternate food and liquid intake     Instrumental Assessment Recommendations reassess via non-instrumental clinical swallow evaluation   ST to f/u briefly bedside in s/o remarkable chest imaging    Comment, Swallowing Recommendations oral hygiene, aspiration precautions        Swallowing Intervention    Dysphagia/Swallowing Interventions monitor tolerance of;current diet without evidence of aspiration;compensatory swallowing strategies        AM-PAC™ - Cognition (Current Function)    Following/understanding a 10-15 minute speech or presentation? 4 - None     Understanding familiar people during ordinary conversations? 4 - None     Remembering to take medications at the appropriate time? 3 - A little     Remembering where things were placed or put away? 4 - None     Remembering a list of 3 or 4 errands without writing it down? 3 - A little     Taking care of complicated tasks? 3 - A little     AM-PAC™ Cognition Score 21        SLP Goals    Swallowing Goal Selection oral nutrition/hydration, SLP Goal 1        Session Outcome    Position at End of Session reclined;in bed     Status at End of Session bed alarm on;all needs met;call light in reach;personal items in reach     Nursing Notified patient's performance;patient's position;patient's response to therapy/activity        Plan    Rehab Potential good, to achieve stated therapy goals     Therapy Frequency 3 times/wk     Planned Therapy Interventions dysphagia therapy;patient/family education               SLP Discharge Recommendations    Flowsheet Row Most Recent Value   Patient/Family Therapy Goal Statement eat/drink at 08/22/2023 0938               Education Documentation  Signs/Symptoms, taught by Sierra Aguirre, CCC-SLP at 8/22/2023 11:55 AM.  Learner: Patient  Readiness: Acceptance  Method: Explanation  Response: Verbalizes Understanding  Comment: aspiration precautions, GERD precautions, oral hygiene, goals, recs, POC.    Risk  Factors, taught by Sierra Aguirre CCC-SLP at 8/22/2023 11:55 AM.  Learner: Patient  Readiness: Acceptance  Method: Explanation  Response: Verbalizes Understanding  Comment: aspiration precautions, GERD precautions, oral hygiene, goals, recs, POC.          SLP Goals    Flowsheet Row Most Recent Value   Oral Nutrition/Hydration Goal 1    Activity effective/safe/independent, oral nutrition/hydration, use of swallowing techniques, management of texture/viscosity at 08/22/2023 0938   Time Frame by discharge at 08/22/2023 0938   Strategies/Barriers GERD at 08/22/2023 0938   Progress/Outcome goal ongoing at 08/22/2023 0938

## 2023-08-22 NOTE — DISCHARGE INSTR - OTHER ORDERS
[employer] is offering Free smoking cessation classes virtually through [employer].  Please register by calling [phone].

## 2023-08-22 NOTE — PROGRESS NOTES
Internal Medicine  Daily Progress Note       SUBJECTIVE   This is a 58 y.o. year-old female admitted on 2023 with Community acquired bacterial pneumonia [J15.9].    Overnight she vomited while in bathroom.  She says she still feels unwell and tired. She denies SOB, CP, abdominal pain, headache, or F/C. She states that she is hungry, as she has not eaten since arriving at hospital.      OBJECTIVE      Vital signs in last 24 hours:  Temp:  [37.1 °C (98.8 °F)-39.4 °C (102.9 °F)] 39.4 °C (102.9 °F)  Heart Rate:  [104-127] 110  Resp:  [18-28] 28  BP: (112-163)/() 134/67  Temp (24hrs), Av.2 °C (100.7 °F), Min:37.1 °C (98.8 °F), Max:39.4 °C (102.9 °F)    Intake/Output     Intake Evening 23 1500 - 23 2259 Night 23 2300 - 23 0659 Day 23 07 - 23 1459 Output Evening 23 1500 - 23 2259 Night 230 - 23 0659 Day 23 07 - 23 1459    I.V. 250 1000 --                    IV Piggyback 100 -- --                    Total 350 1000 -- Total -- -- --   Last 3 shifts --  Intake: 1350       Output: 0       Net: 1350         PHYSICAL EXAMINATION      Physical Exam  Constitutional:       Appearance: She is ill-appearing.   Cardiovascular:      Rate and Rhythm: Tachycardia present.      Heart sounds: Normal heart sounds. No murmur heard.     No gallop.   Pulmonary:      Breath sounds: Rales (right lower) present. No wheezing.   Abdominal:      General: Abdomen is flat. There is no distension.      Palpations: Abdomen is soft.      Tenderness: There is no abdominal tenderness.   Musculoskeletal:      Right lower leg: No edema.      Left lower leg: No edema.   Skin:     General: Skin is warm and dry.     Neuro: Exam difficult to examine due to lethargy   LINES, CATHETERS, DRAINS, AIRWAYS, AND WOUNDS   Lines, Drains, Airways, Wounds:  Peripheral IV (Adult) 23 Right Antecubital (Active)   Number of days: 1       Peripheral IV (Adult) 23 Left  Forearm (Active)   Number of days: 1       Comments:      LABS / IMAGING / TELE      Labs  CBC Results       23    WBC 20.96 24.22 13.48    RBC 4.19 5.02 5.04    HGB 12.8 15.2 15.0    HCT 35.3 43.7 44.7    MCV 84.2 87.1 88.7    MCH 30.5 30.3 29.8    MCHC 36.3 34.8 33.6     213 281          BMP Results       23     135 141    K 3.0 3.2 4.4    Cl 106 97 104    CO2 22 28 29    Glucose 125 158 129    BUN 14 15 15    Creatinine 1.2 1.5 1.3    Calcium 7.3 9.4 9.6    Anion Gap 10 10 8    EGFR 46.1 35.7 42.2         Comment for K at  on 23: Results obtained on plasma. Plasma Potassium values may be up to 0.4 mEQ/L less than serum values. The differences may be greater for patients with high platelet or white cell counts.        M.8     Imaging personally reviewed(does not include unread studies):  No results found.    ECG/Telemetry  I have independently reviewed the ECG. No significant findings.    ASSESSMENT AND PLAN      * Community acquired bacterial pneumonia  Assessment & Plan  P/w SOB, cough productive of clear/yellow sputum x4 days, does have sick contacts (friend with COVID)  CXR shows RLL infiltrate  WBC 24.2  COVID/RSV/flu swab negative  No risk factors for pseudomonas, no recent antibiotic course  Currently not hypoxic, meets 2/4 SIRS criteria with fever, WBC  Hemodynamically stable, lactate 2.0  Doxy Stopped (QTC prolonged)  Ddx: CAP vs aspiration PNA given alcohol drinking history    - CTX day 2  - F/u scx, bcx, legionella ag, MRSA nares - pending  - Supportive care with MDI albuterol, acetaminophen, mucinex          Hypokalemia  Assessment & Plan  K 3.2 on admission  Likely 2/2 GI losses with n/v    -Replete to K>4    Alcohol abuse  Assessment & Plan  2-4 [place] hard drinks/day  Last drink was yesterday, c/f withdrawal    - CIWA protocol, Thiamine, folate, MVI  - Continue aspiration and seizure  precautions  - SLP to eval for aspiration risk  - A consult         ANNI (acute kidney injury) (CMS/HCC)  Assessment & Plan  Cr 1.5 on presentation from b/l of ~1.0  Likely pre-renal 2/2 GI losses, sepsis PNA  Examines dry    - Trend Cr on daily labs; avoid nephrotoxins, dose meds renally  - LR    Depression  Assessment & Plan  Continue home psychiatric meds    Tobacco use  Assessment & Plan  Nicotine patches QD    History of stroke  Assessment & Plan  Continue statin, plavix    GERD (gastroesophageal reflux disease)  Assessment & Plan  Continue protonix       VTE Assessment: Pad[place]    VTE Prophylaxis: Current anticoagulants:  enoxaparin (LOVENOX) syringe 40 mg, subcutaneous, Daily (6p)      Code Status: Full Code  Estimated discharge date: 8/24/2023     ATTENDING DOCUMENTATION  ALSO SEE ATTENDING ATTESTATION SECTION OF NOTE                     c

## 2023-08-22 NOTE — CONSULTS
REPORT TYPE: Consult Note    DATE OF CONSULTATION: 08/22/2023    INFECTIOUS DISEASE CONSULTATION    REASON FOR CONSULTATION:  Pneumonia.    REFERRING PHYSICIAN:  Adenike Reed MD    CONSULTATIVE SERVICE:  Infectious diseases.    HISTORY OF PRESENT ILLNESS:  This is a 58-year-old smoker, who was admitted yesterday with 4 days of cough productive of clear yellowish sputum, nausea and vomiting.  Two days prior to admission, she had episode of shaking, teeth-chattering rigors.  She   did not have tactile fevers.  She did not take her temperature.  Yesterday, she had increasing weakness. Because of this, she came to the emergency room.  She was found to have pneumonia and was started on antibiotics, namely ceftriaxone and doxycycline   and metronidazole.  Today, she feels slightly improved.  The patient relates she has irritable bowel syndrome and takes IMODIUM daily and without it, she has diarrhea; however, over the past 48 hours, she has had persistent diarrhea despite the use of   IMODIUM.  She has had no ill contacts.  She [place], but has no other water exposures.  She has had some contacts who have had some episodes of diarrhea, but no respiratory symptoms.    PAST MEDICAL HISTORY:  Includes a CVA, hyperlipidemia, hypertension, multiple sclerosis, which she has had for 30 years.  There is a thoracic aortic aneurysm that is being observed expectantly.  She has undergone a hysterectomy in the past.    ALLERGIES:  NO KNOWN DRUG ALLERGIES.    SOCIAL HISTORY:  She has been smoking cigarettes 1 pack a day since age 13.  She has 2-3 drinks of alcohol daily.  She has used marijuana in the past.    FAMILY HISTORY:  Negative for infectious diseases.    PHYSICAL EXAMINATION:  GENERAL:  This is a nontoxic appearing white female with a T-max of 102.9.  VITAL SIGNS:  Current temperature 99.8, pulse is 91 on 2 liters nasal cannula, O2 saturation 95%, blood pressure 104/46.  SKIN:  Without  rash.  LYMPHATICS:  No palpable lymphadenopathy.  HEENT:  Atraumatic, normocephalic.  Conjunctivae are pink.  There are upper and lower dentures.  NECK:  Supple.  LUNGS:  Have scattered rhonchi and over the reflection of the right middle lobe there are rales without other changes of consolidation.  HEART:  Regular rate and rhythm without murmur.  ABDOMEN:  Soft and nontender.  There is no hepatosplenomegaly.  EXTREMITIES:  Warm.  There is no evidence of arthritis or peripheral edema.  NEUROLOGIC:  The patient is oriented x3.  MUSCULOSKELETAL:  No muscle tenderness.  No acute arthritis.    LABORATORY AND IMAGING DATA:  Creatinine is 1.2, it was 1.5 yesterday on admission.  Liver enzymes are normal.  Albumin is 2.9, it was 3.9 on admission.  Lactate 2.0 on admission.  White count was 24,000 yesterday, today 20.9, hemoglobin 12.8, platelets   are 176,000.  Urinalysis with too numerous to count WBCs. A Legionella pneumophila group type 1 antigen is positive.  MRSA screen is negative.  Testing via PCR for influenza A, B, respiratory syncytial virus and SARS-CoV-2 negative.    A PA and lateral chest x-ray is personally reviewed.  There is a consolidating infiltrate in the right middle lobe without associated pleural effusions and mediastinal silhouette is normal.    However, CAT scan done in 2019 by report demonstrated a persistent irregularity of the proximal descending aorta with mild outpouching.    An EKG performed yesterday demonstrates tachycardia of 131 with a QTc of 564.  An EKG from 05/2022 demonstrates a QTc of 420.    ASSESSMENT:  1.  Right middle lobe pneumonia due to Legionella.  The clinical presentation of high fever, marked leukocytosis and gastrointestinal symptoms is certainly consistent with this.  The patient does not have a clear water exposure for this, but this is the   case in a significant portion of patients with Legionella.  Certainly, the time of year, now being late summer, is fairly typical  for Legionella.  2.  Thoracic aortic aneurysm.    RECOMMENDATIONS:  Drugs of choice for Legionella pneumonia are azithromycin and Levaquin.  Doxycycline is a second line drug with less clinical experience and a concern for resistance among certain Legionella species.  She has a prolonged QTc, which makes the usage of azithromycin as well as Levaquin problematic;   however, in this setting I believe the risk of using these agents is outweighed by the potential benefit.  Fluoroquinolones are relatively contraindicated because of the thoracic aneurysm.  Therefore, I would treat this patient with azithromycin 500 mg   IV every 24 hours.  I would repeat her EKG once her heart rate has normalized.  I note from prior records, in the past she has taken azithromycin without issue.    The risks and benefits as well as the side effects of the above antibiotics were discussed with the patient.  I explained my medical decision making with her and allowed her time to ask questions.  She was in agreement with my recommendation to initiate treatment with azithromycin.      Bill Faith MD      CC:AKASH KLINE MD    DD: 08/22/2023 17:55  DT: 08/22/2023 18:18  Voice ID: [id number]/Report ID: [id number]  [facility]

## 2023-08-22 NOTE — PLAN OF CARE
Care Coordination Admission Assessment Note    General Information:  Readmission Within the last 30 days: no previous admission in last 30 days  Does patient have a : No  Patient-Specific Goals (include timeframe):      Living Arrangements:  Arrived From: home  Current Living Arrangements: home  People in Home: child(marley), adult  Home Accessibility: stairs to enter home (Group)  Living Arrangement Comments: 3SH, 6STE, bed and full bath on 1st floor    Housing Stability and Financial Resources (SDOH):  In the last 12 months, was there a time when you were not able to pay the mortgage or rent on time?: No  In the last 12 months, how many places have you lived?: 1  In the last 12 months, was there a time when you did not have a steady place to sleep or slept in a shelter (including now)?: No  How hard is it for you to pay for the very basics like food, housing, medical care, and heating?: Not very hard    Functional Status Prior to Admission:   Assistive Device/Animal Currently Used at Home: walker, front-wheeled, cane, straight, shower chair, grab bar, commode, 3-in-1  Functional Status Comments: Independent at baseline, occasionally uses walker or cane  IADL Comments:       Supports and Services:  Current Outpatient/Agency/Support Group: none  Type of Current Home Care Services:    History of home care episode or rehab stay:      Discharge Needs Assessment:   Concerns to be Addressed: denies needs/concerns at this time  Current Discharge Risk:    Anticipated Changes Related to Illness: none    Patient/Family Anticipated Discharge Plan:  Patient/Family Anticipates Transition To: home  Patient/Family Anticipated Services at Transition:      [facility]  Patient agreeable to referral(s).  Referral(s) made via [facility] (C2) platform.  See [facility] Patient Profile for specific details.      Patient Choice:   Offered/Gave Vendor List: no  Patient's Choice of Community Agency(s):          Anticipated Discharge Plan:  Met with patient. Provided education and contact information for Care Coordination services.: yes  Anticipated Discharge Disposition: home without assistance or services, home with assistance     Transportation Needs (SDOH):  Transportation Concerns: none  Transportation Anticipated: family or friend will provide  Is Out of Hospital DNR needed at discharge?: no    In the past 12 months, has lack of transportation kept you from medical appointments or from getting medications?: No  In the past 12 months, has lack of transportation kept you from meetings, work, or from getting things needed for daily living?: No    Concerns - comments: Spoke with patient to verify demographics, emergency contacts, insurance and pharmacy. Patient denies discharge needs at this time, will continue to follow for discharge planning needs.

## 2023-08-22 NOTE — PROGRESS NOTES
Spoke with patient (limited historian) and confirmed with medication list from [facility] and/or patient's own pharmacy to complete the medication reconciliation.     Prior to admission medication list:    Current Outpatient Medications:   •  albuterol HFA (VENTOLIN HFA) 90 mcg/actuation inhaler, Inhale 2 puffs every 6 (six) hours as needed for wheezing or shortness of breath.  •  atorvastatin (LIPITOR) 40 mg tablet, Take 40 mg by mouth daily.  •  azithromycin (ZITHROMAX) 250 mg tablet, Take 1 tablet (250 mg total) by mouth daily. 1 tablet daily for 4 days.  •  clopidogrel (PLAVIX) 75 mg tablet, Take 75 mg by mouth daily.  •  DULoxetine (CYMBALTA) 60 mg capsule, Take 60 mg by mouth nightly.  •  esomeprazole (NexIUM) 40 mg capsule, Take 40 mg by mouth daily before breakfast.  •  metoprolol tartrate (LOPRESSOR) 25 mg tablet, Take 25 mg by mouth 2 (two) times a day.  •  mirtazapine (REMERON) 30 mg tablet, Take 30 mg by mouth nightly.  •  oxyCODONE (ROXICODONE) 5 mg immediate release tablet, Take 10 mg by mouth every 6 (six) hours as needed for moderate pain.    Comments about home medications:    Patient states she does not take Keppra.    Compliance:     Medications have recent fills. Questionable compliance based off patient interview.

## 2023-08-22 NOTE — H&P
Internal Medicine  History & Physical        CHIEF COMPLAINT   SOB   HISTORY OF PRESENT ILLNESS      Ms. Lawrence is a 58 y.o. female who has a past medical history of CVA (cerebral vascular accident) (CMS/[facility]), Hyperlipidemia, Hypertension, and MS (multiple sclerosis) (CMS/[facility]). They presented on 8/21/2023  6:54 PM with Fatigue and is being treated for Community acquired bacterial pneumonia.    Patient states that she was in her usual state health until 4 days ago at which time she began having shortness of breath associated with a cough productive of yellow/clear sputum, nausea, as well as nonbloody bilious vomiting.  She states that she was having 3-4 episodes of vomiting per day as well as worsening dizziness over the past 2 days.  She began feeling lethargic yesterday, but no improvement today leading to her presentation.  She does note that one of her good friends was diagnosed with COVID last week and she spent time with her at the pool.  Denies chest pain, headaches, abdominal pain.  She does endorse chronic diarrhea for which she takes Imodium, no changes in her stool frequency/consistency of recent.  Endorses daily alcohol use described as 2-4 [place] hard per day, last drink last night.  Does not drink liquor typically.  She does not also endorse tobacco use with 1 pack/day smoking, denies other drug use.  Lives at home with her son and daughter, POA is her son.     ED Course:  Vitals   Temperature 37.1 °C (98.8 °F)-->101.2F   BP (!) 139/55   HR (!) 127   RR 18   SpO2 94 % on room air     Notable Labs:  , K3.2, BUN 15, serum creatinine 1.5 (baseline 0.9-1.1), lactate 2.0, troponin 26.7, WBC 24.2 (ANC 19.4), hemoglobin 15.2, platelets 213  COVID-19/influenza A/B/RSV negative    Imaging:  CXR with RLL infiltrate, no effusion    Interventions:  They were treated with ctx/doxy in the ED and subsequently admitted into our care on the general medicine floors.    The patient has confirmed that they would  like to be FULL CODE.  DVT prophylaxis: enoxaparin 40mg.  PAST MEDICAL AND SURGICAL HISTORY      PMHx:  Past Medical History:   Diagnosis Date   • CVA (cerebral vascular accident) (CMS/[facility])    • Hyperlipidemia    • Hypertension    • MS (multiple sclerosis) (CMS/[facility])        PSHx:  Past Surgical History:   Procedure Laterality Date   • HYSTERECTOMY         PCP:   Kilo Carter MD    MEDICATIONS      Prior to Admission medications    Medication Sig Start Date End Date Taking? Authorizing Provider   albuterol HFA (VENTOLIN HFA) 90 mcg/actuation inhaler Inhale 2 puffs every 6 (six) hours as needed for wheezing or shortness of breath. 5/30/22 1/21/25 Yes Radha Oliveira DO   atorvastatin (LIPITOR) 40 mg tablet Take 40 mg by mouth daily. 7/6/18  Yes Burak Negro MD   azithromycin (ZITHROMAX) 250 mg tablet Take 1 tablet (250 mg total) by mouth daily. 1 tablet daily for 4 days. 5/31/22  Yes Radha Oliveira DO   clopidogrel (PLAVIX) 75 mg tablet Take 75 mg by mouth daily. 5/16/18  Yes Burak Negro MD   DULoxetine (CYMBALTA) 60 mg capsule Take 60 mg by mouth nightly. 8/7/23  Yes Arsen Negro MD   esomeprazole (NexIUM) 40 mg capsule Take 40 mg by mouth daily before breakfast. 4/23/18  Yes Burak Negro MD   metoprolol tartrate (LOPRESSOR) 25 mg tablet Take 25 mg by mouth 2 (two) times a day. 8/2/23  Yes Arsen Negro MD   mirtazapine (REMERON) 30 mg tablet Take 30 mg by mouth nightly. 7/3/23  Yes Arsen Negro MD   oxyCODONE (ROXICODONE) 5 mg immediate release tablet Take 10 mg by mouth every 6 (six) hours as needed for moderate pain.   Yes Burak Negro MD       Home medications were personally reviewed.    ALLERGIES      Patient has no known allergies.    FAMILY HISTORY      No family history on file.    SOCIAL HISTORY      Social History     Socioeconomic History   • Marital status:    Tobacco Use   • Smoking status: Every Day     Packs/day: 1.00      Types: Cigarettes   • Smokeless tobacco: Never   Substance and Sexual Activity   • Alcohol use: Yes     Alcohol/week: 3.0 standard drinks of alcohol     Types: 3 Shots of liquor per week     Comment: drinks liquor 2 times per week   • Drug use: Not Currently     Types: Marijuana     Social Determinants of Health     Food Insecurity: No Food Insecurity (5/30/2022)    Hunger Vital Sign    • Worried About Running Out of Food in the Last Year: Never true    • Ran Out of Food in the Last Year: Never true       REVIEW OF SYSTEMS      Review of Systems    PHYSICAL EXAMINATION      Temp:  [37.1 °C (98.8 °F)-38.4 °C (101.2 °F)] 38.2 °C (100.8 °F)  Heart Rate:  [112-127] 112  Resp:  [18-28] 28  BP: (115-163)/() 115/86  Body mass index is 29.26 kg/m².    Physical Exam  Constitutional:       General: She is not in acute distress.     Appearance: Normal appearance. She is normal weight. She is not toxic-appearing.   HENT:      Head: Normocephalic and atraumatic.      Nose: No congestion.      Mouth/Throat:      Mouth: Mucous membranes are moist.      Pharynx: Oropharynx is clear.   Eyes:      Extraocular Movements: Extraocular movements intact.      Conjunctiva/sclera: Conjunctivae normal.      Pupils: Pupils are equal, round, and reactive to light.   Cardiovascular:      Rate and Rhythm: Normal rate and regular rhythm.      Pulses: Normal pulses.      Heart sounds: Normal heart sounds. No murmur heard.  Pulmonary:      Effort: No respiratory distress.      Breath sounds: Rales (RLL) present. No wheezing.   Abdominal:      General: There is no distension.      Palpations: Abdomen is soft.      Tenderness: There is no abdominal tenderness.   Musculoskeletal:         General: No swelling or tenderness. Normal range of motion.      Cervical back: Normal range of motion.   Skin:     General: Skin is warm and dry.   Neurological:      General: No focal deficit present.      Mental Status: She is alert and oriented to  person, place, and time.       LABS / IMAGING / EKG/ TTE        Labs:  I have reviewed the patient's pertinent labs.  Significant abnormals are as below.  Results from last 7 days   Lab Units 08/21/23 2035   WBC K/uL 24.22*   HEMOGLOBIN g/dL 15.2   HEMATOCRIT % 43.7   PLATELETS K/uL 213     Results from last 7 days   Lab Units 08/21/23 2035   SODIUM mEQ/L 135*   POTASSIUM mEQ/L 3.2*   CHLORIDE mEQ/L 97*   CO2 mEQ/L 28   BUN mg/dL 15   CREATININE mg/dL 1.5*   GLUCOSE mg/dL 158*   CALCIUM mg/dL 9.4     Microbiology Data personally reviewed:  Microbiology Results     Procedure Component Value Units Date/Time    SARS-CoV-2 (COVID-19), PCR Nasopharynx [[id number]]  (Normal) Collected: 08/21/23 2037    Specimen: Nasopharyngeal Swab from Nasopharynx Updated: 08/21/23 2211    Narrative:      The following orders were created for panel order SARS-CoV-2 (COVID-19), PCR Nasopharynx.  Procedure                               Abnormality         Status                     ---------                               -----------         ------                     SARS-COV-2 (COVID-19)/ F...[[id number]]  Normal              Final result                 Please view results for these tests on the individual orders.    SARS-COV-2 (COVID-19)/ FLU A/B, AND RSV, PCR Nasopharynx [[id number]]  (Normal) Collected: 08/21/23 2037    Specimen: Nasopharyngeal Swab from Nasopharynx Updated: 08/21/23 2211     SARS-CoV-2 (COVID-19) Negative     Influenza A Negative     Influenza B Negative     Respiratory Syncytial Virus Negative    Narrative:      Testing performed using real-time PCR for detection of COVID-19. EUA approved validation studies performed on site.           Imaging personally reviewed(does not include unread studies):  No results found.    ECG/Telemetry  I have independently reviewed the telemetry. No events for the last 24 hours.    Last TTE on file:   No results found for this or any previous visit.      ASSESSMENT AND PLAN           *  Community acquired bacterial pneumonia  Assessment & Plan  P/w SOB, cough productive of clear/yellow sputum x4 days, does have sick contacts (friend with COVID)  CXR shows RLL infiltrate  WBC 24.2  COVID/RSV/flu swab negative  No risk factors for pseudomonas, no recent antibiotic course  Currently not hypoxic, meets 2/4 SIRS criteria with fever, WBC  Hemodynamically stable, lactate 2.0    Ddx: CAP vs aspiration PNA given alcohol drinking history    - CTX and doxycycline (Qtc prolonged)  - F/u scx, bcx, legionella ag, MRSA nares  - Supportive care with MDI albuterol, acetaminophen, mucinex          Hypokalemia  Assessment & Plan  K 3.2 on admission  Likely 2/2 GI losses with n/v    -Replete to K>4    Depression  Assessment & Plan  Continue home psychiatric meds    Tobacco use  Assessment & Plan  Nicotine patches QD    Alcohol abuse  Assessment & Plan  2-4 [place] hard drinks/day  Last drink was yesterday, c/f withdrawal    - CIWA protocol, Thiamine, folate, MVI  - Continue aspiration and seizure precautions  - SLP to eval for aspiration risk  - A consult         ANNI (acute kidney injury) (CMS/HCC)  Assessment & Plan  Cr 1.5 on presentation from b/l of ~1.0  Likely pre-renal 2/2 GI losses, sepsis PNA  Examines dry    - Trend Cr on daily labs; avoid nephrotoxins, dose meds renally  - LR    History of stroke  Assessment & Plan  Continue statin, plavix    GERD (gastroesophageal reflux disease)  Assessment & Plan  Continue protonix       VTE Assessment: Padua    VTE Prophylaxis: Current anticoagulants:  [START ON 8/22/2023] enoxaparin (LOVENOX) syringe 40 mg, subcutaneous, Daily (6p)      Palliative Care Screen:    Code Status: Full Code  Estimated discharge date: 8/24/2023     ATTENDING DOCUMENTATION  ALSO SEE ATTENDING ATTESTATION SECTION OF NOTE

## 2023-08-23 LAB
ANION GAP SERPL CALC-SCNC: 8 MEQ/L (ref 3–15)
ANION GAP SERPL CALC-SCNC: 8 MEQ/L (ref 3–15)
ATRIAL RATE: 79
BACTERIA UR CULT: NORMAL
BACTERIA UR CULT: NORMAL
BUN SERPL-MCNC: 7 MG/DL (ref 7–25)
BUN SERPL-MCNC: 9 MG/DL (ref 7–25)
CALCIUM SERPL-MCNC: 7.5 MG/DL (ref 8.6–10.3)
CALCIUM SERPL-MCNC: 7.9 MG/DL (ref 8.6–10.3)
CHLORIDE SERPL-SCNC: 106 MEQ/L (ref 98–107)
CHLORIDE SERPL-SCNC: 107 MEQ/L (ref 98–107)
CO2 SERPL-SCNC: 21 MEQ/L (ref 21–31)
CO2 SERPL-SCNC: 24 MEQ/L (ref 21–31)
CREAT SERPL-MCNC: 0.8 MG/DL (ref 0.6–1.2)
CREAT SERPL-MCNC: 0.9 MG/DL (ref 0.6–1.2)
ERYTHROCYTE [DISTWIDTH] IN BLOOD BY AUTOMATED COUNT: 14 % (ref 11.7–14.4)
GFR SERPL CREATININE-BSD FRML MDRD: >60 ML/MIN/1.73M*2
GFR SERPL CREATININE-BSD FRML MDRD: >60 ML/MIN/1.73M*2
GLUCOSE SERPL-MCNC: 77 MG/DL (ref 70–99)
GLUCOSE SERPL-MCNC: 86 MG/DL (ref 70–99)
HCT VFR BLDCO AUTO: 37 % (ref 35–45)
HGB BLD-MCNC: 12.8 G/DL (ref 11.8–15.7)
MAGNESIUM SERPL-MCNC: 1.7 MG/DL (ref 1.8–2.5)
MAGNESIUM SERPL-MCNC: 2.3 MG/DL (ref 1.8–2.5)
MCH RBC QN AUTO: 29.8 PG (ref 28–33.2)
MCHC RBC AUTO-ENTMCNC: 34.6 G/DL (ref 32.2–35.5)
MCV RBC AUTO: 86.2 FL (ref 83–98)
P AXIS: 17
PDW BLD AUTO: 11.7 FL (ref 9.4–12.3)
PLATELET # BLD AUTO: 165 K/UL (ref 150–369)
POTASSIUM SERPL-SCNC: 3.1 MEQ/L (ref 3.5–5.1)
POTASSIUM SERPL-SCNC: 3.9 MEQ/L (ref 3.5–5.1)
PR INTERVAL: 122
QRS DURATION: 76
QT INTERVAL: 376
QTC CALCULATION(BAZETT): 431
R AXIS: 43
RBC # BLD AUTO: 4.29 M/UL (ref 3.93–5.22)
SODIUM SERPL-SCNC: 136 MEQ/L (ref 136–145)
SODIUM SERPL-SCNC: 138 MEQ/L (ref 136–145)
T WAVE AXIS: 47
VENTRICULAR RATE: 79
WBC # BLD AUTO: 14.37 K/UL (ref 3.8–10.5)
WBC STL QL MB STN: NORMAL

## 2023-08-23 PROCEDURE — 93005 ELECTROCARDIOGRAM TRACING: CPT | Performed by: HOSPITALIST

## 2023-08-23 PROCEDURE — 63700000 HC SELF-ADMINISTRABLE DRUG

## 2023-08-23 PROCEDURE — 36415 COLL VENOUS BLD VENIPUNCTURE: CPT

## 2023-08-23 PROCEDURE — 80048 BASIC METABOLIC PNL TOTAL CA: CPT

## 2023-08-23 PROCEDURE — 25000000 HC PHARMACY GENERAL

## 2023-08-23 PROCEDURE — 21400000 HC ROOM AND CARE CCU/INTERMEDIATE

## 2023-08-23 PROCEDURE — 63600000 HC DRUGS/DETAIL CODE: Mod: JZ

## 2023-08-23 PROCEDURE — 99233 SBSQ HOSP IP/OBS HIGH 50: CPT | Performed by: HOSPITALIST

## 2023-08-23 PROCEDURE — 25800000 HC PHARMACY IV SOLUTIONS

## 2023-08-23 PROCEDURE — 83735 ASSAY OF MAGNESIUM: CPT

## 2023-08-23 PROCEDURE — 85027 COMPLETE CBC AUTOMATED: CPT

## 2023-08-23 PROCEDURE — 93010 ELECTROCARDIOGRAM REPORT: CPT | Performed by: INTERNAL MEDICINE

## 2023-08-23 PROCEDURE — 87045 FECES CULTURE AEROBIC BACT: CPT | Performed by: HOSPITALIST

## 2023-08-23 PROCEDURE — 63700000 HC SELF-ADMINISTRABLE DRUG: Performed by: STUDENT IN AN ORGANIZED HEALTH CARE EDUCATION/TRAINING PROGRAM

## 2023-08-23 PROCEDURE — 89055 LEUKOCYTE ASSESSMENT FECAL: CPT | Performed by: HOSPITALIST

## 2023-08-23 RX ORDER — POTASSIUM CHLORIDE 750 MG/1
40 TABLET, EXTENDED RELEASE ORAL ONCE
Status: COMPLETED | OUTPATIENT
Start: 2023-08-23 | End: 2023-08-23

## 2023-08-23 RX ORDER — PANTOPRAZOLE SODIUM 20 MG/1
20 TABLET, DELAYED RELEASE ORAL DAILY
Status: DISCONTINUED | OUTPATIENT
Start: 2023-08-23 | End: 2023-08-25

## 2023-08-23 RX ORDER — CALCIUM CARBONATE 200(500)MG
500 TABLET,CHEWABLE ORAL DAILY PRN
Status: DISCONTINUED | OUTPATIENT
Start: 2023-08-23 | End: 2023-08-26 | Stop reason: HOSPADM

## 2023-08-23 RX ORDER — SODIUM CHLORIDE, SODIUM LACTATE, POTASSIUM CHLORIDE, CALCIUM CHLORIDE 600; 310; 30; 20 MG/100ML; MG/100ML; MG/100ML; MG/100ML
INJECTION, SOLUTION INTRAVENOUS CONTINUOUS
Status: DISCONTINUED | OUTPATIENT
Start: 2023-08-23 | End: 2023-08-24

## 2023-08-23 RX ORDER — LOPERAMIDE HYDROCHLORIDE 2 MG/1
2 CAPSULE ORAL ONCE
Status: COMPLETED | OUTPATIENT
Start: 2023-08-23 | End: 2023-08-23

## 2023-08-23 RX ORDER — POTASSIUM CHLORIDE 750 MG/1
40 TABLET, EXTENDED RELEASE ORAL ONCE
Status: DISCONTINUED | OUTPATIENT
Start: 2023-08-23 | End: 2023-08-23

## 2023-08-23 RX ORDER — PANTOPRAZOLE SODIUM 40 MG/1
40 TABLET, DELAYED RELEASE ORAL DAILY
Status: DISCONTINUED | OUTPATIENT
Start: 2023-08-23 | End: 2023-08-23

## 2023-08-23 RX ADMIN — SODIUM CHLORIDE, POTASSIUM CHLORIDE, SODIUM LACTATE AND CALCIUM CHLORIDE: 600; 310; 30; 20 INJECTION, SOLUTION INTRAVENOUS at 02:14

## 2023-08-23 RX ADMIN — ACETAMINOPHEN 650 MG: 325 TABLET, FILM COATED ORAL at 20:22

## 2023-08-23 RX ADMIN — FOLIC ACID 1 MG: 5 INJECTION, SOLUTION INTRAMUSCULAR; INTRAVENOUS; SUBCUTANEOUS at 10:44

## 2023-08-23 RX ADMIN — LOPERAMIDE HYDROCHLORIDE 2 MG: 2 CAPSULE ORAL at 09:14

## 2023-08-23 RX ADMIN — POTASSIUM CHLORIDE 20 MEQ: 14.9 INJECTION, SOLUTION INTRAVENOUS at 01:45

## 2023-08-23 RX ADMIN — NICOTINE 1 PATCH: 7 PATCH, EXTENDED RELEASE TRANSDERMAL at 08:27

## 2023-08-23 RX ADMIN — CLOPIDOGREL BISULFATE 75 MG: 75 TABLET ORAL at 08:26

## 2023-08-23 RX ADMIN — LOPERAMIDE HYDROCHLORIDE 2 MG: 2 CAPSULE ORAL at 01:46

## 2023-08-23 RX ADMIN — LOPERAMIDE HYDROCHLORIDE 2 MG: 2 CAPSULE ORAL at 17:49

## 2023-08-23 RX ADMIN — ATORVASTATIN CALCIUM 40 MG: 40 TABLET, FILM COATED ORAL at 17:25

## 2023-08-23 RX ADMIN — LOPERAMIDE HYDROCHLORIDE 2 MG: 2 CAPSULE ORAL at 20:22

## 2023-08-23 RX ADMIN — ENOXAPARIN SODIUM 40 MG: 40 INJECTION SUBCUTANEOUS at 17:24

## 2023-08-23 RX ADMIN — AZITHROMYCIN MONOHYDRATE 500 MG: 500 INJECTION, POWDER, LYOPHILIZED, FOR SOLUTION INTRAVENOUS at 17:33

## 2023-08-23 RX ADMIN — PANTOPRAZOLE SODIUM 20 MG: 20 TABLET, DELAYED RELEASE ORAL at 20:22

## 2023-08-23 RX ADMIN — MIRTAZAPINE 30 MG: 30 TABLET, FILM COATED ORAL at 20:22

## 2023-08-23 RX ADMIN — METOPROLOL TARTRATE 25 MG: 25 TABLET, FILM COATED ORAL at 20:23

## 2023-08-23 RX ADMIN — MAGNESIUM SULFATE HEPTAHYDRATE 2 G: 40 INJECTION, SOLUTION INTRAVENOUS at 22:03

## 2023-08-23 RX ADMIN — SODIUM CHLORIDE, POTASSIUM CHLORIDE, SODIUM LACTATE AND CALCIUM CHLORIDE: 600; 310; 30; 20 INJECTION, SOLUTION INTRAVENOUS at 17:40

## 2023-08-23 RX ADMIN — DULOXETINE HYDROCHLORIDE 60 MG: 60 CAPSULE, DELAYED RELEASE ORAL at 20:22

## 2023-08-23 RX ADMIN — POTASSIUM CHLORIDE 40 MEQ: 750 TABLET, EXTENDED RELEASE ORAL at 22:03

## 2023-08-23 RX ADMIN — SODIUM CHLORIDE, POTASSIUM CHLORIDE, SODIUM LACTATE AND CALCIUM CHLORIDE: 600; 310; 30; 20 INJECTION, SOLUTION INTRAVENOUS at 10:41

## 2023-08-23 RX ADMIN — METOPROLOL TARTRATE 25 MG: 25 TABLET, FILM COATED ORAL at 08:26

## 2023-08-23 RX ADMIN — THIAMINE HYDROCHLORIDE 200 MG: 100 INJECTION, SOLUTION INTRAMUSCULAR; INTRAVENOUS at 10:59

## 2023-08-23 ASSESSMENT — COGNITIVE AND FUNCTIONAL STATUS - GENERAL
WALKING IN HOSPITAL ROOM: 2 - A LOT
MOVING TO AND FROM BED TO CHAIR: 4 - NONE
CLIMB 3 TO 5 STEPS WITH RAILING: 4 - NONE
CLIMB 3 TO 5 STEPS WITH RAILING: 2 - A LOT
MOVING TO AND FROM BED TO CHAIR: 2 - A LOT
WALKING IN HOSPITAL ROOM: 4 - NONE
STANDING UP FROM CHAIR USING ARMS: 2 - A LOT
STANDING UP FROM CHAIR USING ARMS: 4 - NONE

## 2023-08-23 NOTE — PLAN OF CARE
Care Coordination Discharge Plan Note     Discharge Needs Assessment  Concerns to be Addressed: denies needs/concerns at this time  Current Discharge Risk:      Anticipated Discharge Plan  Anticipated Discharge Disposition: home without assistance or services, home with assistance       Patient Choice  Offered/Gave Vendor List: no  Patient's Choice of Community Agency(s):           ---------------------------------------------------------------------------------------------------------------------    Interdisciplinary Discharge Plan Review:  Participants:     Concerns Comments: Per info in medical rounds today, pt is not stable for d/c. JERROD Friday. ID following. Anticipate d/c home when stable for d/c.    Discharge Plan:   Disposition/Destination: Home  / Home  Discharge Facility:    Community Resources:      Discharge Transportation:  Is Out of Hospital DNR needed at Discharge: no  Does patient need discharge transport?

## 2023-08-23 NOTE — PROGRESS NOTES
"Infectious Disease Progress Note    Patient Name: Shayla Lawrence  MR#: [id number]  : 1965  Admission Date: 2023  Date: 23   Time: 7:47 AM   Author: Bill Faith MD        Antibiotics:    Anti-infectives (From admission, onward)    Start     Dose/Rate Route Frequency Ordered Stop    23 1900  azithromycin (ZITHROMAX) IVPB 500 mg in 250 mL NSS vial in bag         500 mg  250 mL/hr over 60 Minutes intravenous Every 24 hours interval 23 1810            Subjective     Feels improved other than ongoing diarrhea.  Denies shortness of breath.  Has mild cough productive of small amounts of clear sputum.  Denies shortness of breath, chest pain.  No abdominal pain or cramping    Objective     Vital Signs:    Visit Vitals  BP (!) 122/56 (BP Location: Right upper arm, Patient Position: Lying)   Pulse (!) 118   Temp 36.8 °C (98.3 °F) (Oral)   Resp 20   Ht 1.575 m (5' 2\")   Wt 77.3 kg (170 lb 6.7 oz)   SpO2 92%   BMI 31.17 kg/m²       Temp (24hrs), Av.7 °C (99.8 °F), Min:36.8 °C (98.2 °F), Max:39.4 °C (102.9 °F)    O2 saturation = 96% at the time of my examination on 2 L nasal cannula    Physical Exam:  General:  Nontoxic  Skin: no rashes  HEENT: NC/AT, anicteric sclera, pharynx clear   Neck: supple, no meningismus  Cardiovascular: Rales over lateral right upper chest and right axillary without rhonchi or egophony  Respiratory: clear to auscultation  GI/Abdomen: soft, NT/ND,  no peritoneal signs  Extremities: no clubbing, cyanosis, or edema  MSK:  no muscle tenderness  Lymphatics: no lymphadenopathy  Neurology:  Alert and oriented x 3  Psych: cooperative with exam  : no Otero          Lines, Drains, Airways, Wounds:  Peripheral IV (Adult) 23 Left Forearm (Active)   Number of days: 2       Peripheral IV (Adult) 23 Anterior;Left;Proximal Forearm (Active)   Number of days: 1       Labs:    CBC Results       23     0534 0452 2035    WBC 14.37 20.96 24.22 "    RBC 4.29 4.19 5.02    HGB 12.8 12.8 15.2    HCT 37.0 35.3 43.7    MCV 86.2 84.2 87.1    MCH 29.8 30.5 30.3    MCHC 34.6 36.3 34.8     176 213        CMP Results       08/23/23 08/22/23 08/22/23     0534 1846 0452     137 138    K 3.9 3.5 3.0    Cl 107 107 106    CO2 21 22 22    Glucose 86 136 125    BUN 9 13 14    Creatinine 0.9 1.1 1.2    Calcium 7.5 7.5 7.3    Anion Gap 8 8 10    AST -- -- 19    ALT -- -- 11    Albumin -- -- 2.9    EGFR >60.0 51.0 46.1            Estimated Creatinine Clearance: 65.6 mL/min (by C-G formula based on SCr of 0.9 mg/dL).      Assessment     1.  Legionella pneumonia, right middle lobe   Clinically improved with decreasing tachycardia, improved oxygenation and overall improved sense of wellbeing.  No evidence of bacteremia or secondary infection.  Tolerating azithromycin  2.  Diarrhea   This is likely secondary to her Legionella but we should exclude a bacterial enterocolitis  3.  Thoracic aortic aneurysm  4.  Prolonged QTc   Potential drug-drug interaction with azithromycin        Plan     1.  Continue azithromycin  2.  Send stool for WBCs and enteric bacterial culture  3.  Check follow-up EKG to monitor QTc    Bill Faith MD

## 2023-08-23 NOTE — NURSING NOTE
Pt in bed. Awake, alert. Oriented. Had 2-3 episodes of diarrhea. Call bell in reach. Denies any pain. Will cont to monitor.

## 2023-08-23 NOTE — PLAN OF CARE
Plan of Care Review  Outcome Evaluation: Pt afebrile. OOB to BRP with assist. Frequent diarrhea. Specimen sent. Imodium given. No pain. IVFas ordered. VSS. will cont to monitor.

## 2023-08-23 NOTE — PROGRESS NOTES
Internal Medicine  Daily Progress Note       SUBJECTIVE   This is a 58 y.o. year-old female admitted on 2023 with Community acquired bacterial pneumonia [J15.9].    She reported no overnight events. Her vomiting has been improved, but she has been having more episodes of non bloody diarrhea. Overall, she says she feels better than yesterday. She reports not feeling febrile, but having some chills. She denies CP,SOB, headache or dizziness.      OBJECTIVE      Vital signs in last 24 hours:  Temp:  [36.7 °C (98.1 °F)-38.2 °C (100.7 °F)] 36.7 °C (98.1 °F)  Heart Rate:  [] 79  Resp:  [18-20] 18  BP: ()/(46-58) 129/58  Temp (24hrs), Av.2 °C (99 °F), Min:36.7 °C (98.1 °F), Max:38.2 °C (100.7 °F)    Intake/Output     Intake Evening 23 1500 - 239 Night 23 2300 - 23 0659 Day 23 07 - 23 1459 Output Evening 23 1500 - 23 Night 23 - 2359 Day 23 07 - 23 1459    I.V. -- -- -- Urine -- -- 0    IV Piggyback 50 -- --                    Total 50 -- -- Total -- -- --   Last 3 shifts --  Intake: 50       Output: 0       Net: 50         PHYSICAL EXAMINATION      Physical Exam  HENT:      Mouth/Throat:      Mouth: Mucous membranes are moist.   Cardiovascular:      Rate and Rhythm: Normal rate and regular rhythm.      Pulses: Normal pulses.      Heart sounds: Normal heart sounds. No murmur heard.     No gallop.   Pulmonary:      Effort: Pulmonary effort is normal. No respiratory distress.      Breath sounds: Rales (RLL) present. No wheezing.   Abdominal:      General: Abdomen is flat. Bowel sounds are normal. There is no distension.      Palpations: Abdomen is soft.      Tenderness: There is no abdominal tenderness.   Skin:     General: Skin is warm.   Neurological:      Mental Status: She is alert and oriented to person, place, and time.        LINES, CATHETERS, DRAINS, AIRWAYS, AND WOUNDS   Lines, Drains, Airways,  Wounds:  Peripheral IV (Adult) 08/21/23 Left Forearm (Active)   Number of days: 2       Peripheral IV (Adult) 08/22/23 Anterior;Left;Proximal Forearm (Active)   Number of days: 1       Comments:      LABS / IMAGING / TELE      Labs  CBC Results       08/23/23 08/22/23 08/21/23     0534 0452 2035    WBC 14.37 20.96 24.22    RBC 4.29 4.19 5.02    HGB 12.8 12.8 15.2    HCT 37.0 35.3 43.7    MCV 86.2 84.2 87.1    MCH 29.8 30.5 30.3    MCHC 34.6 36.3 34.8     176 213        BMP Results       08/23/23 08/22/23 08/22/23     0534 1846 0452     137 138    K 3.9 3.5 3.0    Cl 107 107 106    CO2 21 22 22    Glucose 86 136 125    BUN 9 13 14    Creatinine 0.9 1.1 1.2    Calcium 7.5 7.5 7.3    Anion Gap 8 8 10    EGFR >60.0 51.0 46.1          Imaging personally reviewed(does not include unread studies):  X-RAY CHEST 2 VIEWS    Result Date: 8/22/2023  IMPRESSION: Right middle lobe pneumonia. Radiographic follow-up to resolution after treatment is recommended. COMMENT: Comparison: Chest x-ray 5/30/2022. Technique: AP frontal and lateral views of the chest were obtained. FINDINGS: There is new airspace opacity laterally within the right middle lobe concerning for pneumonia. The remainder of the lung is clear. There is no pleural effusion or pneumothorax. The cardia mediastinal silhouette is normal. The upper abdomen is unremarkable. There is no acute osseous abnormality.      ECG/Telemetry  I have independently reviewed the ECG. No significant findings.    ASSESSMENT AND PLAN      * Community acquired bacterial pneumonia  Assessment & Plan  P/w SOB, cough productive of clear/yellow sputum x4 days, does have sick contacts (friend with COVID)  CXR shows RLL infiltrate  WBC 24.2  COVID/RSV/flu swab negative  No risk factors for pseudomonas, no recent antibiotic course  Currently not hypoxic, meets 2/4 SIRS criteria with fever, WBC  Hemodynamically stable, lactate 2.0  Doxy Stopped (QTC prolonged)  Ddx: CAP vs aspiration  PNA given alcohol drinking history  MRSA nares- neg  Legionella Positive    - CTX, doxy, and flagyl D/C'd   - Started on Azithromycin on 8/22- currently day 2  - F/u scx, bcx,   - Supportive care with MDI albuterol, acetaminophen, mucinex          Hypomagnesemia  Assessment & Plan  Mg came back 0.8 (8/22/2023)  Mg went up to 3.5 then 2.3  - Stopped Mg sulfate 2g IV  - Considering EKG  - holding protonix    Hypokalemia  Assessment & Plan  K 3.2 on admission  Likely 2/2 GI losses with n/v    - Mg repleted and K repleted  - Consider EKG   - Replete to K>4    Alcohol abuse  Assessment & Plan  2-4 [place] hard drinks/day  Last drink was yesterday, c/f withdrawal    - CIWA protocol, Thiamine, folate, MVI  - Continue aspiration and seizure precautions  - SLP- rec regular and thins; educated on aspiration precautions  - BHA consult         ANNI (acute kidney injury) (CMS/HCC)  Assessment & Plan  Cr 1.5 on presentation from b/l of ~1.0  Likely pre-renal 2/2 GI losses, sepsis PNA  Examines dry  Improving    - Trend Cr on daily labs; avoid nephrotoxins, dose meds renally  - LR    Depression  Assessment & Plan  Continue home psychiatric meds    Tobacco use  Assessment & Plan  Nicotine patches QD    History of stroke  Assessment & Plan  Continue statin, plavix    GERD (gastroesophageal reflux disease)  Assessment & Plan  Holding Protonix due to low Mg  - Use famotidine if needed       VTE Assessment: Padua    VTE Prophylaxis: Current anticoagulants:  enoxaparin (LOVENOX) syringe 40 mg, subcutaneous, Daily (6p)      Code Status: Full Code  Estimated discharge date: 8/25/2023     ATTENDING DOCUMENTATION  ALSO SEE ATTENDING ATTESTATION SECTION OF NOTE

## 2023-08-24 ENCOUNTER — APPOINTMENT (INPATIENT)
Dept: RADIOLOGY | Facility: HOSPITAL | Age: 58
DRG: 871 | End: 2023-08-24
Payer: MEDICARE

## 2023-08-24 LAB
ANION GAP SERPL CALC-SCNC: 11 MEQ/L (ref 3–15)
BUN SERPL-MCNC: 5 MG/DL (ref 7–25)
CALCIUM SERPL-MCNC: 8.1 MG/DL (ref 8.6–10.3)
CHLORIDE SERPL-SCNC: 101 MEQ/L (ref 98–107)
CO2 SERPL-SCNC: 28 MEQ/L (ref 21–31)
CREAT SERPL-MCNC: 0.9 MG/DL (ref 0.6–1.2)
GFR SERPL CREATININE-BSD FRML MDRD: >60 ML/MIN/1.73M*2
GLUCOSE SERPL-MCNC: 82 MG/DL (ref 70–99)
MAGNESIUM SERPL-MCNC: 1.4 MG/DL (ref 1.8–2.5)
POTASSIUM SERPL-SCNC: 2.8 MEQ/L (ref 3.5–5.1)
SODIUM SERPL-SCNC: 140 MEQ/L (ref 136–145)

## 2023-08-24 PROCEDURE — 63700000 HC SELF-ADMINISTRABLE DRUG

## 2023-08-24 PROCEDURE — 83735 ASSAY OF MAGNESIUM: CPT

## 2023-08-24 PROCEDURE — 71046 X-RAY EXAM CHEST 2 VIEWS: CPT

## 2023-08-24 PROCEDURE — 36415 COLL VENOUS BLD VENIPUNCTURE: CPT

## 2023-08-24 PROCEDURE — 25800000 HC PHARMACY IV SOLUTIONS

## 2023-08-24 PROCEDURE — 21400000 HC ROOM AND CARE CCU/INTERMEDIATE

## 2023-08-24 PROCEDURE — 63600000 HC DRUGS/DETAIL CODE: Mod: JZ

## 2023-08-24 PROCEDURE — 25000000 HC PHARMACY GENERAL

## 2023-08-24 PROCEDURE — 63600000 HC DRUGS/DETAIL CODE: Mod: JZ | Performed by: STUDENT IN AN ORGANIZED HEALTH CARE EDUCATION/TRAINING PROGRAM

## 2023-08-24 PROCEDURE — 80048 BASIC METABOLIC PNL TOTAL CA: CPT

## 2023-08-24 PROCEDURE — 63600000 HC DRUGS/DETAIL CODE

## 2023-08-24 PROCEDURE — 99233 SBSQ HOSP IP/OBS HIGH 50: CPT | Performed by: HOSPITALIST

## 2023-08-24 PROCEDURE — 63700000 HC SELF-ADMINISTRABLE DRUG: Performed by: STUDENT IN AN ORGANIZED HEALTH CARE EDUCATION/TRAINING PROGRAM

## 2023-08-24 RX ORDER — POTASSIUM CHLORIDE 750 MG/1
40 TABLET, EXTENDED RELEASE ORAL ONCE
Status: COMPLETED | OUTPATIENT
Start: 2023-08-24 | End: 2023-08-24

## 2023-08-24 RX ORDER — FUROSEMIDE 10 MG/ML
20 INJECTION INTRAMUSCULAR; INTRAVENOUS ONCE
Status: COMPLETED | OUTPATIENT
Start: 2023-08-24 | End: 2023-08-24

## 2023-08-24 RX ORDER — POTASSIUM CHLORIDE 14.9 MG/ML
20 INJECTION INTRAVENOUS ONCE
Status: COMPLETED | OUTPATIENT
Start: 2023-08-24 | End: 2023-08-25

## 2023-08-24 RX ADMIN — POTASSIUM CHLORIDE 40 MEQ: 750 TABLET, EXTENDED RELEASE ORAL at 20:11

## 2023-08-24 RX ADMIN — DULOXETINE HYDROCHLORIDE 60 MG: 60 CAPSULE, DELAYED RELEASE ORAL at 22:40

## 2023-08-24 RX ADMIN — AZITHROMYCIN MONOHYDRATE 500 MG: 500 INJECTION, POWDER, LYOPHILIZED, FOR SOLUTION INTRAVENOUS at 17:30

## 2023-08-24 RX ADMIN — ALBUTEROL SULFATE 2.5 MG: 2.5 SOLUTION RESPIRATORY (INHALATION) at 07:42

## 2023-08-24 RX ADMIN — METOPROLOL TARTRATE 25 MG: 25 TABLET, FILM COATED ORAL at 20:11

## 2023-08-24 RX ADMIN — CLOPIDOGREL BISULFATE 75 MG: 75 TABLET ORAL at 08:09

## 2023-08-24 RX ADMIN — THIAMINE HYDROCHLORIDE 200 MG: 100 INJECTION, SOLUTION INTRAMUSCULAR; INTRAVENOUS at 08:22

## 2023-08-24 RX ADMIN — MAGNESIUM SULFATE HEPTAHYDRATE 2 G: 40 INJECTION, SOLUTION INTRAVENOUS at 20:13

## 2023-08-24 RX ADMIN — POTASSIUM CHLORIDE 20 MEQ: 14.9 INJECTION, SOLUTION INTRAVENOUS at 22:40

## 2023-08-24 RX ADMIN — MIRTAZAPINE 30 MG: 30 TABLET, FILM COATED ORAL at 22:40

## 2023-08-24 RX ADMIN — NICOTINE 1 PATCH: 7 PATCH, EXTENDED RELEASE TRANSDERMAL at 08:10

## 2023-08-24 RX ADMIN — LOPERAMIDE HYDROCHLORIDE 2 MG: 2 CAPSULE ORAL at 20:11

## 2023-08-24 RX ADMIN — FUROSEMIDE 20 MG: 10 INJECTION, SOLUTION INTRAMUSCULAR; INTRAVENOUS at 11:47

## 2023-08-24 RX ADMIN — FOLIC ACID 1 MG: 5 INJECTION, SOLUTION INTRAMUSCULAR; INTRAVENOUS; SUBCUTANEOUS at 08:22

## 2023-08-24 RX ADMIN — ENOXAPARIN SODIUM 40 MG: 40 INJECTION SUBCUTANEOUS at 17:30

## 2023-08-24 RX ADMIN — METOPROLOL TARTRATE 25 MG: 25 TABLET, FILM COATED ORAL at 08:10

## 2023-08-24 RX ADMIN — ATORVASTATIN CALCIUM 40 MG: 40 TABLET, FILM COATED ORAL at 17:30

## 2023-08-24 ASSESSMENT — COGNITIVE AND FUNCTIONAL STATUS - GENERAL
WALKING IN HOSPITAL ROOM: 4 - NONE
CLIMB 3 TO 5 STEPS WITH RAILING: 4 - NONE
WALKING IN HOSPITAL ROOM: 4 - NONE
CLIMB 3 TO 5 STEPS WITH RAILING: 3 - A LITTLE
MOVING TO AND FROM BED TO CHAIR: 4 - NONE
STANDING UP FROM CHAIR USING ARMS: 4 - NONE
MOVING TO AND FROM BED TO CHAIR: 4 - NONE
STANDING UP FROM CHAIR USING ARMS: 4 - NONE

## 2023-08-24 NOTE — PROGRESS NOTES
Internal Medicine  Daily Progress Note       SUBJECTIVE   This is a 58 y.o. year-old female admitted on 2023 with Community acquired bacterial pneumonia [J15.9].    This morning she was more short of breath and was having some nonradiating chest pain. She said when she was walking to the bathroom it got up to an 8/10, but is not a constant pain. She received a breathing treatment and later was feeling better. She did not have anymore chest pain and her SOB improved.      OBJECTIVE      Vital signs in last 24 hours:  Temp:  [36.9 °C (98.5 °F)-38.2 °C (100.8 °F)] 37 °C (98.6 °F)  Heart Rate:  [] 113  Resp:  [16-22] 16  BP: (113-166)/(53-82) 113/69  Temp (24hrs), Av.3 °C (99.2 °F), Min:36.9 °C (98.5 °F), Max:38.2 °C (100.8 °F)    Intake/Output     Intake Evening 23 1500 - 23 2259 Night 23 2300 - 23 0659 Day 23 0700 - 23 1459 Output Evening 23 1500 - 239 Night 230 - 23 0659 Day 23 0700 - 23 1459    P.O. -- 480 -- Urine -- -- --    I.V.  1500 --                    IV Piggyback -- 50 --                    Total 2030 -- Total -- -- --   Last 3 shifts --  Intake: 4000.8       Output: 0       Net: 4000.8         PHYSICAL EXAMINATION      Physical Exam  HENT:      Mouth/Throat:      Mouth: Mucous membranes are moist.   Cardiovascular:      Rate and Rhythm: Tachycardia present.      Pulses: Normal pulses.      Heart sounds: Normal heart sounds. No murmur heard.     No friction rub.   Pulmonary:      Breath sounds: Wheezing (bilateral throughout) and rales (Right mid axillary and RLL) present.   Abdominal:      General: Abdomen is flat. Bowel sounds are normal. There is no distension.      Palpations: Abdomen is soft.      Tenderness: There is no abdominal tenderness.   Musculoskeletal:      Right lower leg: No edema.      Left lower leg: No edema.   Neurological:      Mental Status: She is alert and oriented to  person, place, and time.        LINES, CATHETERS, DRAINS, AIRWAYS, AND WOUNDS   Lines, Drains, Airways, Wounds:  Peripheral IV (Adult) 08/21/23 Left Forearm (Active)   Number of days: 3       Peripheral IV (Adult) 08/22/23 Anterior;Left;Proximal Forearm (Active)   Number of days: 2       Peripheral IV (Adult) 08/23/23 Anterior;Distal;Left Forearm (Active)   Number of days: 1       Comments:      LABS / IMAGING / TELE      Labs  CBC Results       08/23/23 08/22/23 08/21/23     0534 0452 2035    WBC 14.37 20.96 24.22    RBC 4.29 4.19 5.02    HGB 12.8 12.8 15.2    HCT 37.0 35.3 43.7    MCV 86.2 84.2 87.1    MCH 29.8 30.5 30.3    MCHC 34.6 36.3 34.8     176 213        BMP Results       08/23/23 08/23/23 08/22/23 2002 0534 1846     136 137    K 3.1 3.9 3.5    Cl 106 107 107    CO2 24 21 22    Glucose 77 86 136    BUN 7 9 13    Creatinine 0.8 0.9 1.1    Calcium 7.9 7.5 7.5    Anion Gap 8 8 8    EGFR >60.0 >60.0 51.0          Imaging personally reviewed(does not include unread studies):  X-RAY CHEST 2 VIEWS    Result Date: 8/24/2023  IMPRESSION: Improving airspace opacity right lower lobe. Small pleural effusions are now present     X-RAY CHEST 2 VIEWS    Result Date: 8/22/2023  IMPRESSION: Right middle lobe pneumonia. Radiographic follow-up to resolution after treatment is recommended. COMMENT: Comparison: Chest x-ray 5/30/2022. Technique: AP frontal and lateral views of the chest were obtained. FINDINGS: There is new airspace opacity laterally within the right middle lobe concerning for pneumonia. The remainder of the lung is clear. There is no pleural effusion or pneumothorax. The cardia mediastinal silhouette is normal. The upper abdomen is unremarkable. There is no acute osseous abnormality.      ECG/Telemetry  pending    ASSESSMENT AND PLAN      * Community acquired bacterial pneumonia  Assessment & Plan  P/w SOB, cough productive of clear/yellow sputum x4 days, does have sick contacts (friend with  COVID)  CXR shows RLL infiltrate  WBC 24.2  COVID/RSV/flu swab negative  No risk factors for pseudomonas, no recent antibiotic course  Currently not hypoxic, meets 2/4 SIRS criteria with fever, WBC  Hemodynamically stable, lactate 2.0  Doxy Stopped (QTC prolonged)  Ddx: CAP vs aspiration PNA given alcohol drinking history  MRSA nares- neg  Legionella Positive    - CTX, doxy, and flagyl D/C'd   - Started on Azithromycin on 8/22- currently day 3  - F/u scx, bcx,   - Supportive care with MDI albuterol, acetaminophen, mucinex  - For SOB/CP on 8/24:    - getting CXR PA and lateral- shows pleural effusions c/f volume overload- diuresing Lasix 20 mg QD   - EKG pending   - she got breathing treatment, symptoms improved          Hypomagnesemia  Assessment & Plan  Mg came back 0.8 (8/22/2023)  Mg went up to 3.5 then 2.3 and 1.7  - Replete Mg as needed  - Considering EKG  - holding protonix    Hypokalemia  Assessment & Plan  K 3.2 on admission  Likely 2/2 GI losses with n/v    - Mg replete and K replete  - Consider EKG   - Replete to K>4    Alcohol abuse  Assessment & Plan  2-4 [place] hard drinks/day  Last drink was yesterday, c/f withdrawal    - CIWA protocol, Thiamine, folate, MVI  - Continue aspiration and seizure precautions  - SLP- rec regular and thins; educated on aspiration precautions  - BHA consult- patient not interested         ANNI (acute kidney injury) (CMS/[facility])  Assessment & Plan  Cr 1.5 on presentation from b/l of ~1.0  Likely pre-renal 2/2 GI losses, sepsis PNA  Examines dry  Improving    - Trend Cr on daily labs; avoid nephrotoxins, dose meds renally  - LR    Depression  Assessment & Plan  Continue home psychiatric meds    Tobacco use  Assessment & Plan  Nicotine patches QD    History of stroke  Assessment & Plan  Continue statin, plavix    GERD (gastroesophageal reflux disease)  Assessment & Plan  Holding Protonix due to low Mg  - Use famotidine if needed       VTE Assessment: Padua    VTE Prophylaxis:  Current anticoagulants:  enoxaparin (LOVENOX) syringe 40 mg, subcutaneous, Daily (6p)      Code Status: Full Code  Estimated discharge date: 8/25/2023     ATTENDING DOCUMENTATION  ALSO SEE ATTENDING ATTESTATION SECTION OF NOTE

## 2023-08-24 NOTE — PROGRESS NOTES
"Infectious Disease Progress Note    Patient Name: Shayla Lawrence  MR#: [id number]  : 1965  Admission Date: 2023  Date: 23   Time: 8:29 AM   Author: Bill Faith MD        Antibiotics:    Anti-infectives (From admission, onward)    Start     Dose/Rate Route Frequency Ordered Stop    23 1900  azithromycin (ZITHROMAX) IVPB 500 mg in 250 mL NSS vial in bag         500 mg  250 mL/hr over 60 Minutes intravenous Every 24 hours interval 23 1810            Subjective     Had a quiet night but this morning had some transient chest pain with tachycardia and shortness of breath.  Continues to have diarrhea     Objective     Vital Signs:    Visit Vitals  BP (!) 160/72 (BP Location: Right upper arm, Patient Position: Lying)   Pulse (!) 112   Temp 37.1 °C (98.8 °F) (Oral)   Resp (!) 22   Ht 1.575 m (5' 2\")   Wt 77.3 kg (170 lb 6.7 oz)   SpO2 99%   BMI 31.17 kg/m²       Temp (24hrs), Av.3 °C (99.1 °F), Min:36.7 °C (98.1 °F), Max:38.2 °C (100.8 °F)    Her listed vital signs recorded that she is on 6 L nasal cannula however this appears to be erroneous.  She is currently on 2 L nasal cannula with an O2 saturation of 98%.  This was reviewed and confirmed with her RN    Physical Exam:  General:  Nontoxic  Skin: no rashes  HEENT: NC/AT, anicteric sclera, pharynx clear   Neck: supple, no meningismus  Cardiovascular: regular S1/S2  No murmur, rub , or gallop  Respiratory: Persistent rales over the right middle lobe in the anterior axillary line.  Few crackles at the right base    GI/Abdomen: soft, NT/ND,  no peritoneal signs  Extremities: no clubbing, cyanosis, or edema  MSK:  No swelling, erythema, or  tenderness   Neurology:  Alert and oriented x 3  Psych: cooperative with exam  : no Otero  Peripheral IV without inflammation        Lines, Drains, Airways, Wounds:  Peripheral IV (Adult) 23 Left Forearm (Active)   Number of days: 3       Peripheral IV (Adult) 23 " Anterior;Left;Proximal Forearm (Active)   Number of days: 2       Peripheral IV (Adult) 08/23/23 Anterior;Distal;Left Forearm (Active)   Number of days: 1       Labs:    CBC Results       08/23/23 08/22/23 08/21/23     0534 0452 2035    WBC 14.37 20.96 24.22    RBC 4.29 4.19 5.02    HGB 12.8 12.8 15.2    HCT 37.0 35.3 43.7    MCV 86.2 84.2 87.1    MCH 29.8 30.5 30.3    MCHC 34.6 36.3 34.8     176 213        CMP Results       08/23/23 08/23/23 08/22/23 2002 0534 1846     136 137    K 3.1 3.9 3.5    Cl 106 107 107    CO2 24 21 22    Glucose 77 86 136    BUN 7 9 13    Creatinine 0.8 0.9 1.1    Calcium 7.9 7.5 7.5    Anion Gap 8 8 8    EGFR >60.0 >60.0 51.0            Estimated Creatinine Clearance: 73.8 mL/min (by C-G formula based on SCr of 0.8 mg/dL).      Assessment     1.  Legionella pneumonia, right middle lobe   Some clinical decline this morning despite improving white count.  Unclear if this is due to worsening pneumonia or another process.              2.  Diarrhea              This is likely secondary to her Legionella but we should exclude a bacterial enterocolitis.  Please note the diarrhea preceded her azithromycin use repeat    No fecal leukocytes.  Stool culture in progress  3.  Thoracic aortic aneurysm  4.  Prolonged QTc               EKG demonstrates normalization of QTc          Plan     1.  Continue azithromycin  2.  An EKG has been ordered by the primary team  3.  Check repeat PA and lateral chest x-ray to help track potential worsening of her pneumonia  4.  Discussed with primary team    Bill Faith MD

## 2023-08-24 NOTE — PLAN OF CARE
Care Coordination Discharge Plan Note     Discharge Needs Assessment  Concerns to be Addressed: care coordination/care conferences, discharge planning  Current Discharge Risk:      Anticipated Discharge Plan  Anticipated Discharge Disposition: home with assistance       Patient Choice  Offered/Gave Vendor List: no  Patient's Choice of Community Agency(s):         ---------------------------------------------------------------------------------------------------------------------    Interdisciplinary Discharge Plan Review:  Participants:, social work/services, physical therapy, nursing, physician    Concerns Comments: Per discharge planning rounds, pt with c/o shortness of breath and chest pain. EKG and Chest x-ray was done and pt will need some diuresising. JERROD is tentative for Sat/Sun. Care Coordination will continue to follow for discharge needs.    Discharge Plan:   Disposition/Destination: Home  / Home  Discharge Facility:    Community Resources:      Discharge Transportation:  Is Out of Hospital DNR needed at Discharge: no  Does patient need discharge transport? No

## 2023-08-25 LAB
ANION GAP SERPL CALC-SCNC: 7 MEQ/L (ref 3–15)
ANION GAP SERPL CALC-SCNC: 9 MEQ/L (ref 3–15)
BACTERIA STL CULT: NORMAL
BUN SERPL-MCNC: 5 MG/DL (ref 7–25)
BUN SERPL-MCNC: 5 MG/DL (ref 7–25)
CALCIUM SERPL-MCNC: 8.1 MG/DL (ref 8.6–10.3)
CALCIUM SERPL-MCNC: 8.2 MG/DL (ref 8.6–10.3)
CHLORIDE SERPL-SCNC: 103 MEQ/L (ref 98–107)
CHLORIDE SERPL-SCNC: 105 MEQ/L (ref 98–107)
CO2 SERPL-SCNC: 27 MEQ/L (ref 21–31)
CO2 SERPL-SCNC: 27 MEQ/L (ref 21–31)
CREAT SERPL-MCNC: 0.8 MG/DL (ref 0.6–1.2)
CREAT SERPL-MCNC: 0.9 MG/DL (ref 0.6–1.2)
ERYTHROCYTE [DISTWIDTH] IN BLOOD BY AUTOMATED COUNT: 13.4 % (ref 11.7–14.4)
GFR SERPL CREATININE-BSD FRML MDRD: >60 ML/MIN/1.73M*2
GFR SERPL CREATININE-BSD FRML MDRD: >60 ML/MIN/1.73M*2
GLUCOSE SERPL-MCNC: 94 MG/DL (ref 70–99)
GLUCOSE SERPL-MCNC: 95 MG/DL (ref 70–99)
HCT VFR BLDCO AUTO: 32.8 % (ref 35–45)
HGB BLD-MCNC: 11.6 G/DL (ref 11.8–15.7)
MAGNESIUM SERPL-MCNC: 1.8 MG/DL (ref 1.8–2.5)
MAGNESIUM SERPL-MCNC: 1.9 MG/DL (ref 1.8–2.5)
MCH RBC QN AUTO: 29.8 PG (ref 28–33.2)
MCHC RBC AUTO-ENTMCNC: 35.4 G/DL (ref 32.2–35.5)
MCV RBC AUTO: 84.3 FL (ref 83–98)
PDW BLD AUTO: 11.5 FL (ref 9.4–12.3)
PLATELET # BLD AUTO: 191 K/UL (ref 150–369)
POTASSIUM SERPL-SCNC: 3.4 MEQ/L (ref 3.5–5.1)
POTASSIUM SERPL-SCNC: 3.8 MEQ/L (ref 3.5–5.1)
RBC # BLD AUTO: 3.89 M/UL (ref 3.93–5.22)
SODIUM SERPL-SCNC: 139 MEQ/L (ref 136–145)
SODIUM SERPL-SCNC: 139 MEQ/L (ref 136–145)
WBC # BLD AUTO: 11.38 K/UL (ref 3.8–10.5)

## 2023-08-25 PROCEDURE — 63600000 HC DRUGS/DETAIL CODE: Mod: JZ | Performed by: STUDENT IN AN ORGANIZED HEALTH CARE EDUCATION/TRAINING PROGRAM

## 2023-08-25 PROCEDURE — 63600000 HC DRUGS/DETAIL CODE: Mod: JZ

## 2023-08-25 PROCEDURE — 21400000 HC ROOM AND CARE CCU/INTERMEDIATE

## 2023-08-25 PROCEDURE — 63700000 HC SELF-ADMINISTRABLE DRUG: Performed by: STUDENT IN AN ORGANIZED HEALTH CARE EDUCATION/TRAINING PROGRAM

## 2023-08-25 PROCEDURE — 36415 COLL VENOUS BLD VENIPUNCTURE: CPT

## 2023-08-25 PROCEDURE — 63700000 HC SELF-ADMINISTRABLE DRUG: Performed by: HOSPITALIST

## 2023-08-25 PROCEDURE — 92526 ORAL FUNCTION THERAPY: CPT | Mod: GN

## 2023-08-25 PROCEDURE — 85027 COMPLETE CBC AUTOMATED: CPT

## 2023-08-25 PROCEDURE — 63700000 HC SELF-ADMINISTRABLE DRUG

## 2023-08-25 PROCEDURE — 99233 SBSQ HOSP IP/OBS HIGH 50: CPT | Performed by: HOSPITALIST

## 2023-08-25 PROCEDURE — 83735 ASSAY OF MAGNESIUM: CPT

## 2023-08-25 PROCEDURE — 80048 BASIC METABOLIC PNL TOTAL CA: CPT

## 2023-08-25 RX ORDER — IBUPROFEN/PSEUDOEPHEDRINE HCL 200MG-30MG
3 TABLET ORAL NIGHTLY
Status: DISCONTINUED | OUTPATIENT
Start: 2023-08-25 | End: 2023-08-26 | Stop reason: HOSPADM

## 2023-08-25 RX ORDER — AZITHROMYCIN 250 MG/1
500 TABLET, FILM COATED ORAL DAILY
Status: DISCONTINUED | OUTPATIENT
Start: 2023-08-25 | End: 2023-08-26 | Stop reason: HOSPADM

## 2023-08-25 RX ORDER — POTASSIUM CHLORIDE 750 MG/1
40 TABLET, EXTENDED RELEASE ORAL ONCE
Status: COMPLETED | OUTPATIENT
Start: 2023-08-25 | End: 2023-08-25

## 2023-08-25 RX ORDER — POTASSIUM CHLORIDE 14.9 MG/ML
20 INJECTION INTRAVENOUS ONCE
Status: DISCONTINUED | OUTPATIENT
Start: 2023-08-25 | End: 2023-08-25

## 2023-08-25 RX ORDER — LANOLIN ALCOHOL/MO/W.PET/CERES
400 CREAM (GRAM) TOPICAL ONCE
Status: COMPLETED | OUTPATIENT
Start: 2023-08-25 | End: 2023-08-25

## 2023-08-25 RX ORDER — FAMOTIDINE 20 MG/1
40 TABLET, FILM COATED ORAL DAILY
Status: DISCONTINUED | OUTPATIENT
Start: 2023-08-25 | End: 2023-08-26 | Stop reason: HOSPADM

## 2023-08-25 RX ORDER — IBUPROFEN/PSEUDOEPHEDRINE HCL 200MG-30MG
3 TABLET ORAL NIGHTLY
Status: CANCELLED | OUTPATIENT
Start: 2023-08-25

## 2023-08-25 RX ORDER — POTASSIUM CHLORIDE 750 MG/1
20 TABLET, EXTENDED RELEASE ORAL ONCE
Status: COMPLETED | OUTPATIENT
Start: 2023-08-25 | End: 2023-08-25

## 2023-08-25 RX ORDER — LOPERAMIDE HYDROCHLORIDE 2 MG/1
4 CAPSULE ORAL DAILY
Status: DISCONTINUED | OUTPATIENT
Start: 2023-08-25 | End: 2023-08-26 | Stop reason: HOSPADM

## 2023-08-25 RX ADMIN — MAGNESIUM SULFATE IN DEXTROSE 1 G: 10 INJECTION, SOLUTION INTRAVENOUS at 22:42

## 2023-08-25 RX ADMIN — ATORVASTATIN CALCIUM 40 MG: 40 TABLET, FILM COATED ORAL at 17:28

## 2023-08-25 RX ADMIN — DULOXETINE HYDROCHLORIDE 60 MG: 60 CAPSULE, DELAYED RELEASE ORAL at 22:42

## 2023-08-25 RX ADMIN — AZITHROMYCIN 500 MG: 250 TABLET, FILM COATED ORAL at 19:53

## 2023-08-25 RX ADMIN — POTASSIUM CHLORIDE 20 MEQ: 14.9 INJECTION, SOLUTION INTRAVENOUS at 04:10

## 2023-08-25 RX ADMIN — FAMOTIDINE 40 MG: 20 TABLET, FILM COATED ORAL at 09:52

## 2023-08-25 RX ADMIN — ENOXAPARIN SODIUM 40 MG: 40 INJECTION SUBCUTANEOUS at 17:28

## 2023-08-25 RX ADMIN — METOPROLOL TARTRATE 25 MG: 25 TABLET, FILM COATED ORAL at 08:29

## 2023-08-25 RX ADMIN — LOPERAMIDE HYDROCHLORIDE 2 MG: 2 CAPSULE ORAL at 08:29

## 2023-08-25 RX ADMIN — POTASSIUM CHLORIDE 20 MEQ: 750 TABLET, EXTENDED RELEASE ORAL at 09:52

## 2023-08-25 RX ADMIN — CLOPIDOGREL BISULFATE 75 MG: 75 TABLET ORAL at 08:29

## 2023-08-25 RX ADMIN — MAGNESIUM OXIDE TAB 400 MG (241.3 MG ELEMENTAL MG) 400 MG: 400 (241.3 MG) TAB at 19:52

## 2023-08-25 RX ADMIN — MIRTAZAPINE 30 MG: 30 TABLET, FILM COATED ORAL at 22:42

## 2023-08-25 RX ADMIN — LOPERAMIDE HYDROCHLORIDE 2 MG: 2 CAPSULE ORAL at 21:42

## 2023-08-25 RX ADMIN — Medication 3 MG: at 22:42

## 2023-08-25 RX ADMIN — LOPERAMIDE HYDROCHLORIDE 4 MG: 2 CAPSULE ORAL at 12:48

## 2023-08-25 RX ADMIN — MAGNESIUM SULFATE HEPTAHYDRATE 2 G: 40 INJECTION, SOLUTION INTRAVENOUS at 09:52

## 2023-08-25 RX ADMIN — METOPROLOL TARTRATE 25 MG: 25 TABLET, FILM COATED ORAL at 19:52

## 2023-08-25 RX ADMIN — NICOTINE 1 PATCH: 7 PATCH, EXTENDED RELEASE TRANSDERMAL at 08:31

## 2023-08-25 RX ADMIN — THIAMINE HCL TAB 100 MG 100 MG: 100 TAB at 08:29

## 2023-08-25 RX ADMIN — POTASSIUM CHLORIDE 40 MEQ: 750 TABLET, EXTENDED RELEASE ORAL at 08:32

## 2023-08-25 RX ADMIN — POTASSIUM CHLORIDE 40 MEQ: 750 TABLET, EXTENDED RELEASE ORAL at 19:52

## 2023-08-25 RX ADMIN — FOLIC ACID 1 MG: 1 TABLET ORAL at 08:29

## 2023-08-25 ASSESSMENT — COGNITIVE AND FUNCTIONAL STATUS - GENERAL
TAKING CARE OF COMPLICATED TASKS: 3 - A LITTLE
REMEMBERING 5 ERRANDS WITH NO LIST: 4 - NONE
REMEMBERING TO TAKE MEDICATION: 4 - NONE
UNDERSTANDING 10 TO 15 MIN SPEECH: 4 - NONE
REMEMBERING WHERE THINGS ARE: 4 - NONE
FOLLOWS FAMILIAR CONVERSATION: 4 - NONE
AFFECT: WFL
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
## 2023-08-25 NOTE — PROGRESS NOTES
Internal Medicine  Daily Progress Note       SUBJECTIVE   This is a 58 y.o. year-old female admitted on 2023 with Community acquired bacterial pneumonia [J15.9].    Reports no acute overnight events. She was feeling well this morning and her shortness of breath is improved. She still has diarrhea. Anytime she drinks or eats she says she gets diarrhea. She also has been having difficulty sleeping. She denies Cp, palpitations, N/V, F/C, and headaches.     OBJECTIVE      Vital signs in last 24 hours:  Temp:  [36.9 °C (98.5 °F)-37.6 °C (99.7 °F)] 37.6 °C (99.7 °F)  Heart Rate:  [80-96] 80  Resp:  [18] 18  BP: (116-135)/(56-80) 131/60  Temp (24hrs), Av.2 °C (98.9 °F), Min:36.9 °C (98.5 °F), Max:37.6 °C (99.7 °F)    Intake/Output     Intake Evening 23 1500 - 23 2259 Night 23 2300 - 23 0659 Day 23 0700 - 23 1459 Output Evening 23 1500 - 239 Night 23 - 23 0659 Day 23 0700 - 23 1459    P.O. -- -- -- Urine -- -- --    I.V. 10 -- --                    IV Piggyback 150 -- --                    Total 160 -- -- Total -- -- --   Last 3 shifts --  Intake: 160       Output: 0       Net: 160         PHYSICAL EXAMINATION      Physical Exam  HENT:      Mouth/Throat:      Mouth: Mucous membranes are moist.   Cardiovascular:      Rate and Rhythm: Normal rate and regular rhythm.      Pulses: Normal pulses.      Heart sounds: Normal heart sounds. No murmur heard.     No gallop.   Pulmonary:      Effort: Pulmonary effort is normal. No respiratory distress.      Breath sounds: Rales (RML at axilla, Less in RLL) present. No wheezing.   Abdominal:      General: Abdomen is flat. Bowel sounds are normal. There is no distension.      Palpations: Abdomen is soft.      Tenderness: There is no abdominal tenderness.   Musculoskeletal:      Right lower leg: No edema.      Left lower leg: No edema.   Neurological:      Mental Status: She is alert and oriented  to person, place, and time.        LINES, CATHETERS, DRAINS, AIRWAYS, AND WOUNDS   Lines, Drains, Airways, Wounds:  Peripheral IV (Adult) 08/21/23 Left Forearm (Active)   Number of days: 4       Peripheral IV (Adult) 08/22/23 Anterior;Left;Proximal Forearm (Active)   Number of days: 3       Peripheral IV (Adult) 08/23/23 Anterior;Distal;Left Forearm (Active)   Number of days: 2       Peripheral IV (Adult) 08/25/23 Posterior;Right Forearm (Active)   Number of days: 0       Comments:      LABS / IMAGING / TELE      Labs  CBC Results       08/25/23 08/23/23 08/22/23     0439 0534 0452    WBC 11.38 14.37 20.96    RBC 3.89 4.29 4.19    HGB 11.6 12.8 12.8    HCT 32.8 37.0 35.3    MCV 84.3 86.2 84.2    MCH 29.8 29.8 30.5    MCHC 35.4 34.6 36.3     165 176        BMP Results       08/25/23 08/24/23 08/23/23     0439 1800 2002     140 138    K 3.4 2.8 3.1    Cl 103 101 106    CO2 27 28 24    Glucose 94 82 77    BUN 5 5 7    Creatinine 0.9 0.9 0.8    Calcium 8.2 8.1 7.9    Anion Gap 9 11 8    EGFR >60.0 >60.0 >60.0          Imaging personally reviewed(does not include unread studies):  X-RAY CHEST 2 VIEWS    Result Date: 8/24/2023  IMPRESSION: Improving airspace opacity right lower lobe. Small pleural effusions are now present     X-RAY CHEST 2 VIEWS    Result Date: 8/22/2023  IMPRESSION: Right middle lobe pneumonia. Radiographic follow-up to resolution after treatment is recommended. COMMENT: Comparison: Chest x-ray 5/30/2022. Technique: AP frontal and lateral views of the chest were obtained. FINDINGS: There is new airspace opacity laterally within the right middle lobe concerning for pneumonia. The remainder of the lung is clear. There is no pleural effusion or pneumothorax. The cardia mediastinal silhouette is normal. The upper abdomen is unremarkable. There is no acute osseous abnormality.      ECG/Telemetry  N/A    ASSESSMENT AND PLAN      * Community acquired bacterial pneumonia  Assessment & Plan  P/w  SOB, cough productive of clear/yellow sputum x4 days, does have sick contacts (friend with COVID)  CXR shows RLL infiltrate  WBC 24.2  COVID/RSV/flu swab negative  No risk factors for pseudomonas, no recent antibiotic course  Currently not hypoxic, meets 2/4 SIRS criteria with fever, WBC  Hemodynamically stable, lactate 2.0  Doxy Stopped (QTC prolonged)  Ddx: CAP vs aspiration PNA given alcohol drinking history  MRSA nares- neg  Legionella Positive  For SOB/CP on 8/24: got CXR PA and lateral- shows pleural effusions c/f volume overload      - diuressed with Lasix 20 mg- improved    - CTX, doxy, and flagyl D/C'd   - Started on Azithromycin on 8/22, transitioned to PO- currently day 4  - continue to monitor Electrolytes.   - Increase Imodium to reduce losses via diarrhea  - scx, bcx, stool culture- negative  - Supportive care with MDI albuterol, acetaminophen, mucinex            Hypomagnesemia  Assessment & Plan  Mg came back 0.8 (8/22/2023)  Mg went up to 3.5 then 2.3 and 1.7  - Replete Mg as needed  - Considering EKG  - holding protonix  -Increased imodium to reduce diarrhea    Hypokalemia  Assessment & Plan  K 3.2 on admission  Likely 2/2 GI losses with n/v    - Mg replete and K replete  - Consider EKG   - Replete to K>4  - Increased imodium to reduce diarrhea    Alcohol abuse  Assessment & Plan  2-4 [place] hard drinks/day  Last drink was yesterday, c/f withdrawal    - CIWA protocol, Thiamine, folate, MVI  - Continue aspiration and seizure precautions  - SLP- rec regular and thins; educated on aspiration precautions  - BHA consult- patient not interested         ANNI (acute kidney injury) (CMS/[facility])  Assessment & Plan  Cr 1.5 on presentation from b/l of ~1.0  Likely pre-renal 2/2 GI losses, sepsis PNA  Examines dry  Improving    - Trend Cr on daily labs; avoid nephrotoxins, dose meds renally  - LR    Depression  Assessment & Plan  Continue home psychiatric meds    Tobacco use  Assessment & Plan  Nicotine patches  QD    History of stroke  Assessment & Plan  Continue statin, plavix    GERD (gastroesophageal reflux disease)  Assessment & Plan  Holding Protonix due to low Mg  - Use famotidine if needed       VTE Assessment: Padua    VTE Prophylaxis: Current anticoagulants:  enoxaparin (LOVENOX) syringe 40 mg, subcutaneous, Daily (6p)      Code Status: Full Code  Estimated discharge date: 8/27/2023     ATTENDING DOCUMENTATION  ALSO SEE ATTENDING ATTESTATION SECTION OF NOTE

## 2023-08-25 NOTE — PROGRESS NOTES
"Infectious Disease Progress Note    Patient Name: Shayla Lawrence  MR#: [id number]  : 1965  Admission Date: 2023  Date: 23   Time: 8:07 AM   Author: Bill Faith MD        Antibiotics:    Anti-infectives (From admission, onward)    Start     Dose/Rate Route Frequency Ordered Stop    23 1900  azithromycin (ZITHROMAX) IVPB 500 mg in 250 mL NSS vial in bag         500 mg  250 mL/hr over 60 Minutes intravenous Every 24 hours interval 23 1810            Subjective      no issues overnight.  No further shortness of breath or chest pain    Objective     Vital Signs:    Visit Vitals  /80 (BP Location: Right upper arm, Patient Position: Lying)   Pulse 90   Temp 37 °C (98.6 °F) (Oral)   Resp 18   Ht 1.575 m (5' 2\")   Wt 77.3 kg (170 lb 6.7 oz)   SpO2 95%   BMI 31.17 kg/m²     On room air    Temp (24hrs), Av.1 °C (98.7 °F), Min:36.9 °C (98.5 °F), Max:37.4 °C (99.3 °F)    Physical Exam:  General:  Nontoxic  Skin: no rashes  HEENT: NC/AT, anicteric sclera, pharynx clear   Neck: supple, no meningismus  Cardiovascular: regular S1/S2  No murmur, rub , or gallop  Respiratory: few rales over RML, no rhonchi  GI/Abdomen: soft, NT/ND,  no peritoneal signs  Extremities: no clubbing, cyanosis, or edema  Neurology:  Alert and oriented x 3  Psych: cooperative with exam  : no [place]          Lines, Drains, Airways, Wounds:  Peripheral IV (Adult) 23 Left Forearm (Active)   Number of days: 4       Peripheral IV (Adult) 23 Anterior;Left;Proximal Forearm (Active)   Number of days: 3       Peripheral IV (Adult) 23 Anterior;Distal;Left Forearm (Active)   Number of days: 2       Peripheral IV (Adult) 23 Posterior;Right Forearm (Active)   Number of days: 0       Labs:    CBC Results       23     0439 0534 0452    WBC 11.38 14.37 20.96    RBC 3.89 4.29 4.19    HGB 11.6 12.8 12.8    HCT 32.8 37.0 35.3    MCV 84.3 86.2 84.2    MCH 29.8 29.8 30.5    MCHC " 35.4 34.6 36.3     165 176        CMP Results       08/25/23 08/24/23 08/23/23     0439 1800 2002     140 138    K 3.4 2.8 3.1    Cl 103 101 106    CO2 27 28 24    Glucose 94 82 77    BUN 5 5 7    Creatinine 0.9 0.9 0.8    Calcium 8.2 8.1 7.9    Anion Gap 9 11 8    EGFR >60.0 >60.0 >60.0            Estimated Creatinine Clearance: 65.6 mL/min (by C-G formula based on SCr of 0.9 mg/dL).      Assessment     1.  Legionella pneumonia, right middle lobe             Clinically improved.  Now comfortable with stable Vs on room air.  No tachypnea or tachycardia.   Repeat CXR 8/24 shows improvement in RML infiltrate  (original report had a typo stating RLL)            2.  Diarrhea              Improving              No fecal leukocytes.  Stool culture in progress, no pathogens identified so far  3.  Thoracic aortic aneurysm  4.  Prolonged QTc               EKG demonstrates normalization of QTc following resolution of metabolic and electrolyte disorders        Plan     Change Azithromycin to 500 mg po q 24 hours and complete 10 days of macrolide therapy.  Today is day #5 of treatment  Will follow PRN    Bill Faith MD

## 2023-08-25 NOTE — PLAN OF CARE
Care Coordination Discharge Plan Note     Discharge Needs Assessment  Concerns to be Addressed: care coordination/care conferences, discharge planning  Current Discharge Risk:      Anticipated Discharge Plan  Anticipated Discharge Disposition: home with assistance       Patient Choice  Offered/Gave Vendor List: no  Patient's Choice of Community Agency(s):           ---------------------------------------------------------------------------------------------------------------------    Interdisciplinary Discharge Plan Review:  Participants: Physician; nursing; OT; SW; care coordinator    Concerns Comments:  Chronic illness    Discharge Plan:   Disposition/Destination: Home  / Home  Discharge Facility:    Community Resources:      Discharge Transportation:  Is Out of Hospital DNR needed at Discharge: no  Does patient need discharge transport? No   Per care team rounds, patient will likely be stable for discharge Saturday vs Sunday. Pneumonia is improving and is on oral antibiotics. No discharge needs.

## 2023-08-25 NOTE — PROGRESS NOTES
Patient:  Shayla Lawrence  Location:  [place] [address]  MRN:  [id number]  Today's date:  8/25/2023    SLP Diagnosis  Grossly functional oropharyngeal stages w/in limitations of BSE and w/ regard to missing dentition. Denies esophageal symptoms this date.    Swallowing Recommendations  Diet Consistency thin liquids, regular (+moisture adaptations (sauces, gravies, condiments))     Medication Administration whole with liquid   Supervision Level patient independent   Feeding Recommendations allow patient to feed self if maintaining safety, small bites/sips, stop meal if showing signs of aspiration or fatigue (e.g., coughing, wet voice)   Posture Recommendations fully upright in chair/chair mode of bed   Swallowing Strategies alternate food and liquid intake   Instrumental Assessment Recommendations  (ST to s/o, please reoconsult as medically indicated).     Comments general aspiration/GERD precautions, oral hygiene     Summary/Handoff  ST f/u completed. Mentation much improved, ? baseline. Pt denies dysphagia or esophageal symptoms this date. Grossly functional appearing oropharyngeal swallow w/ in limitations of BSE and w/ regard to missing dentition w/ po trials at the bedside. At this time, pt appears to be tolerating LRD and meds w/o complaint or difficulty. RML airspace opacity improving on repeat imaging. ST to s/o at this time, please reconsult as medically indicated.    Active Diet Orders (From admission, onward)     Start     Ordered    08/22/23 1002  Adult Diet Regular; Thin Liquids; RD/LDN may adjust order  Diet effective now        Question Answer Comment   Diet Texture Regular    Fluid Consistency: Thin Liquids    Delegation of Authority. Diet orders written by PA/CRBecca may not be adjusted by RD/LDNs. RD/LDN may adjust order        08/22/23 1001                Shayla is a 58 y.o. female admitted on 8/21/2023 with Community acquired bacterial pneumonia [J15.9]. Principal problem is  "Community acquired bacterial pneumonia.    Past Medical History  Shayla has a past medical history of CVA (cerebral vascular accident) (CMS/HCC), Hyperlipidemia, Hypertension, and MS (multiple sclerosis) (CMS/[facility]).    History of Present Illness   adm to [facility] 8/21 w/ c/o generalized weakness. +sepsis d/t pna, c/f CAP vs. possible aspiration pna. Chest imaging- \"new airspace opacity laterally w/in RML c/f pna, remainder lung clear\". CIWA.      SLP Pain    Date/Time Pain Type Rating: Rest Rating: Activity [facility]   08/25/23 0845 Pain Assessment 0 - no pain 0 - no pain MGT          Prior Living Environment    Flowsheet Row Most Recent Value   People in Home child(marley), adult   Current Living Arrangements home   Home Accessibility stairs to enter home (Group)        Prior Level of Function    Flowsheet Row Most Recent Value   Communication understands/communicates without difficulty   Swallowing difficulty swallowing liquids   Baseline Diet/Method of Nutritional Intake no diet restrictions, thin liquids, regular   Past History of Dysphagia predisposing dysphagia risk factors-hx CVA ~5 yrs ago. ST c/s d/t pt w/ \"coughing while eating\" but w/ detailed questioning with patient, symptoms appear largely esophageal related as pt endorsing \"no difficulty swallow\" but endorses globus sensation and regurgitation of liquids.   Assistive Device Currently Used at Home walker, front-wheeled, cane, straight, shower chair, grab bar, commode, 3-in-1           SLP Evaluation and Treatment - 08/25/23 0845        SLP Time Calculation    Start Time 0845     Stop Time 0855     Time Calculation (min) 10 min        General Information    Document Type Daily Treatment/Progress Note     Mode of Treatment individual therapy;speech language pathology     Position at Start of Session in bed;reclined     Status at Start of Session agreeable to therapy;no issues or concerns identified by nurse prior to session     General Observations of Patient " "awake/alert, reports feeling \"much better\"        Precautions/Limitations/Impairments    Existing Precautions/Restrictions fall;seizures   CIWA    Limitations/Impairments swallowing        Cognition/Psychosocial    Affect/Mental Status WFL     Orientation Status oriented x 3     Comment, Cognition mentation improved, suspect baseline now        GRBAS    Grade 0-->none     Roughness 0-->none     Breathiness 0-->none     Asthenia 0-->none     Strain 0-->none     Unable to Perform GRBAS 0        Functional Communication Measures    FCM: Swallowing 7-->Level 7        General Swallowing Observations    Current Diet/Method of Nutritional Intake thin liquids;regular     Signs/Symptoms of Aspiration (Current Diet) --   airspace opacity in RML    Respiratory Support (General Swallowing Observations) none     Comment, Secretions/Suctioning WFL     Comment, General Swallowing Observations pt denies dysphagia or esophageal symptoms        Food and Liquid Trials (NIS)    Patient Positioning HOB elevated (specify degrees)     Oral Intake/Feeding Performance independent/appropriate self-feeding skills     Liquid Consistencies Evaluated thin liquids     Thin Liquids intact     Comment, Thin Liquids single and consec sips w/o overt s/sx aspiration     Food Consistencies Evaluated regular     Regular intact;patient controlled amounts     Comment, Regular functional mastication/breakdown, no s/sx aspiration     Oral Preparatory Phase of Swallow WFL     Oral Phase of Swallow WFL     Comment, Oral Phase WFL now appears grossly functional given baseline mentation and in s/o missing dentition.     Pharyngeal Phase of Swallow no clinical symptoms     Esophageal Phase of Swallow no clinical symptoms     Comment pt also seen w/ med pass taking large pills, 1-2 at a time w/ liquid wash w/o difficulty        Swallowing Recommendations    Diet Consistency Recommendations thin liquids;regular   +moisture adaptations (sauces, gravies, condiments) "    Medication Administration whole with liquid     Supervision Level for Intake patient independent     Feeding/Delivery Recommendations allow patient to feed self if maintaining safety;small bites/sips;stop meal if showing signs of aspiration or fatigue (e.g., coughing, wet voice)     Posture Recommendations fully upright in chair/chair mode of bed     Swallowing Strategies alternate food and liquid intake     Instrumental Assessment Recommendations --   ST to s/o, please reoconsult as medically indicated    Comment, Swallowing Recommendations general aspiration/GERD precautions, oral hygiene        Swallowing Intervention    Comment, Swallowing Interventions ST to s/o        AM-PAC™ - Cognition (Current Function)    Following/understanding a 10-15 minute speech or presentation? 4 - None     Understanding familiar people during ordinary conversations? 4 - None     Remembering to take medications at the appropriate time? 4 - None     Remembering where things were placed or put away? 4 - None     Remembering a list of 3 or 4 errands without writing it down? 4 - None     Taking care of complicated tasks? 3 - A little     AM-PAC™ Cognition Score 23        Session Outcome    Position at End of Session upright;in bed     Status at End of Session bed alarm on;all needs met;call light in reach;personal items in reach     Nursing Notified patient's performance;patient's position;patient's response to therapy/activity        Plan    Planned Therapy Interventions --   ST to s/o              SLP Discharge Recommendations    Flowsheet Row Most Recent Value   Patient/Family Therapy Goal Statement go home at 08/25/2023 0845               Education Documentation  Signs/Symptoms, taught by Sierra Aguirre CCC-SLP at 8/25/2023 11:04 AM.  Learner: Patient  Readiness: Acceptance  Method: Explanation  Response: Verbalizes Understanding  Comment: aspiration/GERD precautions    Risk Factors, taught by Sierra Aguirre CCC-SLP at  8/25/2023 11:04 AM.  Learner: Patient  Readiness: Acceptance  Method: Explanation  Response: Verbalizes Understanding  Comment: aspiration/GERD precautions          SLP Goals    Flowsheet Row Most Recent Value   Oral Nutrition/Hydration Goal 1    Activity effective/safe/independent, oral nutrition/hydration, use of swallowing techniques, management of texture/viscosity at 08/22/2023 0938   Time Frame by discharge at 08/22/2023 0938   Strategies/Barriers GERD at 08/22/2023 0938   Progress/Outcome goal met at 08/25/2023 0845

## 2023-08-25 NOTE — PLAN OF CARE
Plan of Care Review  Progress: no change  Outcome Evaluation: Received pt from RN at 2330. Potassium infusion running. Pt restless in room - offers no c/o pain or discomfort. Still having small amounts of diarrhea. AAOX4, assist x1 to brp. FSp in place call bell in reach

## 2023-08-26 VITALS
HEART RATE: 82 BPM | DIASTOLIC BLOOD PRESSURE: 80 MMHG | OXYGEN SATURATION: 94 % | TEMPERATURE: 98.2 F | WEIGHT: 170.42 LBS | SYSTOLIC BLOOD PRESSURE: 99 MMHG | RESPIRATION RATE: 18 BRPM | HEIGHT: 62 IN | BODY MASS INDEX: 31.36 KG/M2

## 2023-08-26 LAB
ANION GAP SERPL CALC-SCNC: 9 MEQ/L (ref 3–15)
BACTERIA BLD CULT: NORMAL
BACTERIA BLD CULT: NORMAL
BUN SERPL-MCNC: 6 MG/DL (ref 7–25)
CALCIUM SERPL-MCNC: 8.4 MG/DL (ref 8.6–10.3)
CHLORIDE SERPL-SCNC: 107 MEQ/L (ref 98–107)
CO2 SERPL-SCNC: 24 MEQ/L (ref 21–31)
CREAT SERPL-MCNC: 0.9 MG/DL (ref 0.6–1.2)
ERYTHROCYTE [DISTWIDTH] IN BLOOD BY AUTOMATED COUNT: 13.7 % (ref 11.7–14.4)
GFR SERPL CREATININE-BSD FRML MDRD: >60 ML/MIN/1.73M*2
GLUCOSE SERPL-MCNC: 107 MG/DL (ref 70–99)
HCT VFR BLDCO AUTO: 34.2 % (ref 35–45)
HGB BLD-MCNC: 12 G/DL (ref 11.8–15.7)
MAGNESIUM SERPL-MCNC: 1.7 MG/DL (ref 1.8–2.5)
MCH RBC QN AUTO: 30.1 PG (ref 28–33.2)
MCHC RBC AUTO-ENTMCNC: 35.1 G/DL (ref 32.2–35.5)
MCV RBC AUTO: 85.7 FL (ref 83–98)
PDW BLD AUTO: 11.3 FL (ref 9.4–12.3)
PLATELET # BLD AUTO: 269 K/UL (ref 150–369)
POTASSIUM SERPL-SCNC: 4.1 MEQ/L (ref 3.5–5.1)
RBC # BLD AUTO: 3.99 M/UL (ref 3.93–5.22)
SODIUM SERPL-SCNC: 140 MEQ/L (ref 136–145)
WBC # BLD AUTO: 11.54 K/UL (ref 3.8–10.5)

## 2023-08-26 PROCEDURE — 63700000 HC SELF-ADMINISTRABLE DRUG

## 2023-08-26 PROCEDURE — 99239 HOSP IP/OBS DSCHRG MGMT >30: CPT | Performed by: HOSPITALIST

## 2023-08-26 PROCEDURE — 80048 BASIC METABOLIC PNL TOTAL CA: CPT

## 2023-08-26 PROCEDURE — 36415 COLL VENOUS BLD VENIPUNCTURE: CPT

## 2023-08-26 PROCEDURE — 83735 ASSAY OF MAGNESIUM: CPT

## 2023-08-26 PROCEDURE — 63700000 HC SELF-ADMINISTRABLE DRUG: Performed by: HOSPITALIST

## 2023-08-26 PROCEDURE — 63600000 HC DRUGS/DETAIL CODE: Mod: JZ

## 2023-08-26 PROCEDURE — 85027 COMPLETE CBC AUTOMATED: CPT

## 2023-08-26 RX ORDER — AZITHROMYCIN 250 MG/1
500 TABLET, FILM COATED ORAL DAILY
Qty: 12 TABLET | Refills: 0 | Status: SHIPPED | OUTPATIENT
Start: 2023-08-26 | End: 2023-09-01

## 2023-08-26 RX ORDER — POTASSIUM CHLORIDE 750 MG/1
20 TABLET, EXTENDED RELEASE ORAL ONCE
Status: DISCONTINUED | OUTPATIENT
Start: 2023-08-26 | End: 2023-08-26

## 2023-08-26 RX ORDER — NICOTINE 7MG/24HR
1 PATCH, TRANSDERMAL 24 HOURS TRANSDERMAL DAILY
Qty: 30 PATCH | Refills: 0 | Status: SHIPPED | OUTPATIENT
Start: 2023-08-27 | End: 2023-09-26

## 2023-08-26 RX ORDER — FAMOTIDINE 40 MG/1
40 TABLET, FILM COATED ORAL DAILY
Qty: 30 TABLET | Refills: 0 | Status: SHIPPED | OUTPATIENT
Start: 2023-08-27 | End: 2023-09-26

## 2023-08-26 RX ADMIN — CLOPIDOGREL BISULFATE 75 MG: 75 TABLET ORAL at 08:29

## 2023-08-26 RX ADMIN — METOPROLOL TARTRATE 25 MG: 25 TABLET, FILM COATED ORAL at 08:29

## 2023-08-26 RX ADMIN — FAMOTIDINE 40 MG: 20 TABLET, FILM COATED ORAL at 08:29

## 2023-08-26 RX ADMIN — MAGNESIUM SULFATE HEPTAHYDRATE 2 G: 2 INJECTION, SOLUTION INTRAVENOUS at 12:08

## 2023-08-26 RX ADMIN — LOPERAMIDE HYDROCHLORIDE 4 MG: 2 CAPSULE ORAL at 08:29

## 2023-08-26 RX ADMIN — FOLIC ACID 1 MG: 1 TABLET ORAL at 08:29

## 2023-08-26 RX ADMIN — THIAMINE HCL TAB 100 MG 100 MG: 100 TAB at 08:29

## 2023-08-26 RX ADMIN — NICOTINE 1 PATCH: 7 PATCH, EXTENDED RELEASE TRANSDERMAL at 08:29

## 2023-08-26 ASSESSMENT — COGNITIVE AND FUNCTIONAL STATUS - GENERAL
CLIMB 3 TO 5 STEPS WITH RAILING: 4 - NONE
WALKING IN HOSPITAL ROOM: 4 - NONE
MOVING TO AND FROM BED TO CHAIR: 4 - NONE
STANDING UP FROM CHAIR USING ARMS: 4 - NONE

## 2023-08-26 NOTE — NURSING NOTE
Pt stable for discharge. IVs removed. Cardiac monitor off. All belongings returned to pt. Daughter at bedside to bring pt home.

## 2023-08-26 NOTE — DISCHARGE INSTRUCTIONS
Dear Ms. Howard,    You initially presented to [facility] as you are experiencing fatigue, shortness of breath, cough, nausea, vomiting.  Our investigations showed that this was likely due to legionnaires disease which is caused by a bacteria known as Legionella.  The mainstay treatment for this is an antibiotic called azithromycin which you were started on and will continue for total of 10 days (you started this while you were in the hospital).  We also noted that you had significant electrolyte abnormalities as you are experiencing nausea, vomiting and diarrhea.  As your symptoms improved, your electrolyte abnormalities also stabilized however we encourage you to obtain outpatient labs with your primary care physician shortly after discharge to ensure your potassium and magnesium are stable.    As you are PPI, also known as esomeprazole/Nexium, can commonly cause electrolyte abnormalities we have held this for now.  Instead we have started you on Pepcid which can also help with GERD symptoms but does not cause low magnesium.  Please take this for now and follow-up with your primary care physician.    We thank you for choosing [facility].  We are glad you are now feeling better.  Please find a list of to-do's below.  [ ] follow up with PCP within 1 week. Please obtain labs at this time, specifically a BMP & Magnesium level   [ ] Continue Azithromycin 500mg once daily until 08/31. Please take this dose at 7:00pm.  [ ] Continue imodium to prevent excessive diarrhea.
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
## 2024-04-09 ENCOUNTER — APPOINTMENT (EMERGENCY)
Dept: RADIOLOGY | Facility: HOSPITAL | Age: 59
End: 2024-04-09
Payer: MEDICARE

## 2024-04-09 ENCOUNTER — HOSPITAL ENCOUNTER (EMERGENCY)
Facility: HOSPITAL | Age: 59
Discharge: HOME | End: 2024-04-09
Attending: EMERGENCY MEDICINE | Admitting: EMERGENCY MEDICINE
Payer: MEDICARE

## 2024-04-09 VITALS
HEIGHT: 62 IN | BODY MASS INDEX: 29.44 KG/M2 | WEIGHT: 160 LBS | OXYGEN SATURATION: 100 % | RESPIRATION RATE: 16 BRPM | SYSTOLIC BLOOD PRESSURE: 93 MMHG | HEART RATE: 70 BPM | TEMPERATURE: 97.9 F | DIASTOLIC BLOOD PRESSURE: 53 MMHG

## 2024-04-09 DIAGNOSIS — R51.9 ACUTE NONINTRACTABLE HEADACHE, UNSPECIFIED HEADACHE TYPE: Primary | ICD-10-CM

## 2024-04-09 LAB
FLUAV RNA SPEC QL NAA+PROBE: NEGATIVE
FLUBV RNA SPEC QL NAA+PROBE: NEGATIVE
RSV RNA SPEC QL NAA+PROBE: NEGATIVE
SARS-COV-2 RNA RESP QL NAA+PROBE: NEGATIVE

## 2024-04-09 PROCEDURE — 99284 EMERGENCY DEPT VISIT MOD MDM: CPT | Mod: 25

## 2024-04-09 PROCEDURE — 70450 CT HEAD/BRAIN W/O DYE: CPT

## 2024-04-09 PROCEDURE — 87637 SARSCOV2&INF A&B&RSV AMP PRB: CPT

## 2024-04-09 ASSESSMENT — ENCOUNTER SYMPTOMS
SPEECH DIFFICULTY: 0
HEADACHES: 1
DIARRHEA: 0
ACTIVITY CHANGE: 0
BACK PAIN: 0
NAUSEA: 0
ABDOMINAL PAIN: 0
AGITATION: 0
SHORTNESS OF BREATH: 0
NECK PAIN: 0
COUGH: 0
VOMITING: 0
COLOR CHANGE: 0
WEAKNESS: 0
DIFFICULTY URINATING: 0
SEIZURES: 0
FEVER: 0

## 2024-04-09 NOTE — DISCHARGE INSTRUCTIONS
Please continue staying hydrated and taking tylenol and motrin as needed for the headaches. Return to the ED for worsening of symptoms or any problems or concerns, slurred speech, weakness, numbness, worsening symptoms.  It is very important to follow up with your healthcare provider for re-evaluation.   
No

## 2024-04-09 NOTE — ED PROVIDER NOTES
Emergency Medicine Note  HPI   HISTORY OF PRESENT ILLNESS     58-year-old female with past medical history of CVA, hyperlipidemia, hypertension, MS, presents to the ED for evaluation of headache behind her left eye for the past 3 days.  Took 4 ibuprofen prior to arrival, currently headache free.  Was worried given her history.  No weakness, numbness, slurred speech, visual changes, dizziness, lightheadedness.  Did not fall or hit her head.  No sick contacts, cough, congestion, fevers or chills.  No other complaints at this time          Patient History   PAST HISTORY     Reviewed from Nursing Triage:       Past Medical History:   Diagnosis Date    CVA (cerebral vascular accident) (CMS/[facility])     Hyperlipidemia     Hypertension     MS (multiple sclerosis) (CMS/[facility])        Past Surgical History:   Procedure Laterality Date    HYSTERECTOMY         History reviewed. No pertinent family history.    Social History     Tobacco Use    Smoking status: Every Day     Packs/day: 1     Types: Cigarettes    Smokeless tobacco: Never   Substance Use Topics    Alcohol use: Yes     Alcohol/week: 3.0 standard drinks of alcohol     Types: 3 Shots of liquor per week     Comment: drinks liquor 2 times per week    Drug use: Not Currently     Types: Marijuana         Review of Systems   REVIEW OF SYSTEMS     Review of Systems   Constitutional:  Negative for activity change and fever.   Respiratory:  Negative for cough and shortness of breath.    Cardiovascular:  Negative for chest pain and leg swelling.   Gastrointestinal:  Negative for abdominal pain, diarrhea, nausea and vomiting.   Genitourinary:  Negative for difficulty urinating.   Musculoskeletal:  Negative for back pain and neck pain.   Skin:  Negative for color change.   Neurological:  Positive for headaches. Negative for seizures, syncope, speech difficulty and weakness.   Psychiatric/Behavioral:  Negative for agitation and behavioral problems.          VITALS     ED Vitals       Date/Time Temp Pulse Resp BP SpO2 [facility]   04/09/24 1524 -- 70 -- 93/53 100 %    04/09/24 1246 36.6 °C (97.9 °F) 73 16 118/88 100 % MMP          Pulse Ox %: 100 % (04/09/24 1252)  Pulse Ox Interpretation: Normal (04/09/24 1252)  Heart Rate: 73 (04/09/24 1252)        Physical Exam   PHYSICAL EXAM     Physical Exam  Vitals and nursing note reviewed.   Constitutional:       Appearance: She is well-developed.   HENT:      Head: Normocephalic and atraumatic.   Eyes:      Conjunctiva/sclera: Conjunctivae normal.   Cardiovascular:      Rate and Rhythm: Normal rate and regular rhythm.   Pulmonary:      Effort: Pulmonary effort is normal.      Breath sounds: Normal breath sounds.   Abdominal:      General: There is no distension.      Palpations: Abdomen is soft. There is no mass.      Tenderness: There is no abdominal tenderness.   Musculoskeletal:         General: No tenderness or deformity. Normal range of motion.      Cervical back: Normal range of motion.   Skin:     General: Skin is warm and dry.   Neurological:      General: No focal deficit present.      Mental Status: She is alert and oriented to person, place, and time. Mental status is at baseline.      Cranial Nerves: No cranial nerve deficit.      Sensory: No sensory deficit.      Motor: No weakness.      Coordination: Coordination normal.      Gait: Gait normal.      Comments: No ataxia   Psychiatric:         Behavior: Behavior normal.           PROCEDURES     Procedures     DATA     Results       Procedure Component Value Units Date/Time    SARS-COV-2 (COVID-19)/ FLU A/B, AND RSV, PCR Nasopharynx [[id number]]  (Normal) Collected: 04/09/24 1255    Specimen: Nasopharyngeal Swab from Nasopharynx Updated: 04/09/24 1413     SARS-CoV-2 (COVID-19) Negative     Influenza A Negative     Influenza B Negative     Respiratory Syncytial Virus Negative    Narrative:      Testing performed using real-time PCR for detection of COVID-19. EUA approved validation studies  performed on site.             Imaging Results              CT HEAD WITHOUT IV CONTRAST (Final result)  Result time 04/09/24 15:12:01      Final result                   Impression:    IMPRESSION:  No acute intracranial hemorrhage or mass-effect.               Narrative:    CLINICAL HISTORY:  Headache, new or worsening.    COMMENT:    Comparison: CT head 9/15/2018    Technique: CT examination of the brain was performed without contrast. Sagittal  and coronal reconstructions were obtained. CT DOSE:  One or more dose reduction  techniques (e.g. automated exposure control, adjustment of the mA and/or kV  according to patient size, use of iterative reconstruction technique) was  utilized for this examination.    Findings:  Ventricles and sulci are prominent, likely compatible with age-related cerebral  volume loss. No acute intracranial hemorrhage, extra axial collection, mass  effect or edema. No CT evidence of large acute territorial infarction.  Stable  encephalomalacia in the right frontal lobe and basal ganglia, compatible with  sequela of prior infarct.  Patchy periventricular and subcortical white matter  hypoattenuation, which is nonspecific, however likely compatible with chronic  microangiopathy. Intracranial vascular calcifications at the skull base. Mild  mucosal thickening in the paranasal sinuses. The calvarium is intact.                                      No orders to display       Scoring tools                                  ED Course & MDM   MDM / ED COURSE / CLINICAL IMPRESSION / DISPO     Medical Decision Making  Headache that started this morning over left eye, resolved after 800 mg of Motrin  No focal neurologic deficits, coordination intact, no ataxia  Will get CT head given history of MS, hypertension, hyperlipidemia, COVID swab    Amount and/or Complexity of Data Reviewed  Labs: ordered.  Radiology: ordered.        ED Course as of 04/09/24 1844   Tue Apr 09, 2024 1843 CT head negative.  Respiratory swab negative. Patient still remains without headache after motrin at home,comfortable, and stable. Recommended to continue with tylenol and motrin rotating, hydration, rest. Will follow up with PCP. RTER discussed, questions elicited and answered, stable for discharge home. [TD]      ED Course User Index  [TD] Theo Whaley PA C     Clinical Impression      Acute nonintractable headache, unspecified headache type     _________________       ED Disposition   Discharge                       Theo Whaley PA C  04/09/24 0288

## 2024-04-11 NOTE — ED ATTESTATION NOTE
The patient was evaluated and managed by the physician assistant / nurse practitioner.     Jose Melton MD  04/11/24 2020

## 2024-06-07 ENCOUNTER — HOSPITAL ENCOUNTER (OUTPATIENT)
Dept: RADIOLOGY | Facility: HOSPITAL | Age: 59
Discharge: HOME | End: 2024-06-07
Attending: INTERNAL MEDICINE
Payer: MEDICARE

## 2024-06-07 ENCOUNTER — TRANSCRIBE ORDERS (OUTPATIENT)
Dept: SCHEDULING | Age: 59
End: 2024-06-07

## 2024-06-07 DIAGNOSIS — M25.512 PAIN IN LEFT SHOULDER: Primary | ICD-10-CM

## 2024-06-07 DIAGNOSIS — R93.89 ABNORMAL FINDINGS ON DIAGNOSTIC IMAGING OF OTHER SPECIFIED BODY STRUCTURES: Primary | ICD-10-CM

## 2024-06-07 DIAGNOSIS — M25.512 PAIN IN LEFT SHOULDER: ICD-10-CM

## 2024-06-07 PROCEDURE — 73030 X-RAY EXAM OF SHOULDER: CPT | Mod: LT

## 2024-06-17 ENCOUNTER — HOSPITAL ENCOUNTER (OUTPATIENT)
Dept: RADIOLOGY | Facility: HOSPITAL | Age: 59
Discharge: HOME | End: 2024-06-17
Attending: INTERNAL MEDICINE
Payer: MEDICARE

## 2024-06-17 DIAGNOSIS — R93.89 ABNORMAL FINDINGS ON DIAGNOSTIC IMAGING OF OTHER SPECIFIED BODY STRUCTURES: ICD-10-CM

## 2024-06-17 PROCEDURE — 71250 CT THORAX DX C-: CPT
